# Patient Record
Sex: FEMALE | Race: BLACK OR AFRICAN AMERICAN | NOT HISPANIC OR LATINO | Employment: FULL TIME | URBAN - METROPOLITAN AREA
[De-identification: names, ages, dates, MRNs, and addresses within clinical notes are randomized per-mention and may not be internally consistent; named-entity substitution may affect disease eponyms.]

---

## 2023-03-14 ENCOUNTER — TELEPHONE (OUTPATIENT)
Dept: PRIMARY CARE | Facility: CLINIC | Age: 63
End: 2023-03-14
Payer: COMMERCIAL

## 2023-03-14 DIAGNOSIS — R30.0 DYSURIA: Primary | ICD-10-CM

## 2023-03-14 NOTE — TELEPHONE ENCOUNTER
Patient left a voicemail that the medication she was taking is not working and was advised to call you and let you know she would like to try the other medication

## 2023-04-01 ENCOUNTER — LAB (OUTPATIENT)
Dept: LAB | Facility: LAB | Age: 63
End: 2023-04-01

## 2023-04-01 DIAGNOSIS — R30.0 DYSURIA: ICD-10-CM

## 2023-04-01 LAB
APPEARANCE, URINE: ABNORMAL
BACTERIA, URINE: ABNORMAL /HPF
BILIRUBIN, URINE: NEGATIVE
BLOOD, URINE: ABNORMAL
COLOR, URINE: ABNORMAL
GLUCOSE, URINE: NEGATIVE MG/DL
HYALINE CASTS, URINE: ABNORMAL /LPF
KETONES, URINE: NEGATIVE MG/DL
LEUKOCYTE ESTERASE, URINE: ABNORMAL
MUCUS, URINE: ABNORMAL /LPF
NITRITE, URINE: NEGATIVE
PH, URINE: 5 (ref 5–8)
PROTEIN, URINE: NEGATIVE MG/DL
RBC, URINE: 5 /HPF (ref 0–5)
SPECIFIC GRAVITY, URINE: 1.03 (ref 1–1.03)
SQUAMOUS EPITHELIAL CELLS, URINE: 22 /HPF
UROBILINOGEN, URINE: <2 MG/DL (ref 0–1.9)
WBC, URINE: 24 /HPF (ref 0–5)

## 2023-04-01 PROCEDURE — 87186 SC STD MICRODIL/AGAR DIL: CPT

## 2023-04-01 PROCEDURE — 81003 URINALYSIS AUTO W/O SCOPE: CPT

## 2023-04-01 PROCEDURE — 81001 URINALYSIS AUTO W/SCOPE: CPT

## 2023-04-01 PROCEDURE — 87086 URINE CULTURE/COLONY COUNT: CPT

## 2023-04-01 PROCEDURE — 87077 CULTURE AEROBIC IDENTIFY: CPT

## 2023-04-04 LAB — URINE CULTURE: ABNORMAL

## 2023-04-06 ENCOUNTER — TELEPHONE (OUTPATIENT)
Dept: PRIMARY CARE | Facility: CLINIC | Age: 63
End: 2023-04-06
Payer: COMMERCIAL

## 2023-04-10 DIAGNOSIS — N30.00 ACUTE CYSTITIS WITHOUT HEMATURIA: Primary | ICD-10-CM

## 2023-04-10 PROBLEM — I10 BENIGN ESSENTIAL HYPERTENSION: Status: ACTIVE | Noted: 2017-05-17

## 2023-04-10 PROBLEM — E87.6 HYPOKALEMIA: Status: ACTIVE | Noted: 2018-11-28

## 2023-04-10 PROBLEM — N39.0 UTI (URINARY TRACT INFECTION): Status: ACTIVE | Noted: 2018-11-28

## 2023-04-10 PROBLEM — R73.03 PREDIABETES: Status: ACTIVE | Noted: 2018-05-09

## 2023-04-10 PROBLEM — K29.70 GASTRITIS: Status: ACTIVE | Noted: 2018-11-29

## 2023-04-10 PROBLEM — E78.2 MIXED HYPERLIPIDEMIA: Status: ACTIVE | Noted: 2023-04-10

## 2023-04-10 RX ORDER — HYDROCHLOROTHIAZIDE 12.5 MG/1
12.5 TABLET ORAL DAILY
COMMUNITY
End: 2024-01-18 | Stop reason: SDUPTHER

## 2023-04-10 RX ORDER — POTASSIUM CHLORIDE 1500 MG/1
1 TABLET, EXTENDED RELEASE ORAL DAILY
COMMUNITY
Start: 2022-05-02 | End: 2024-01-18 | Stop reason: SDUPTHER

## 2023-04-10 RX ORDER — ATORVASTATIN CALCIUM 20 MG/1
20 TABLET, FILM COATED ORAL NIGHTLY
COMMUNITY
End: 2024-01-18 | Stop reason: SDUPTHER

## 2023-04-10 RX ORDER — CIPROFLOXACIN 500 MG/1
500 TABLET ORAL 2 TIMES DAILY
Qty: 10 TABLET | Refills: 0 | Status: SHIPPED | OUTPATIENT
Start: 2023-04-10 | End: 2023-04-15

## 2023-06-21 ENCOUNTER — CLINICAL SUPPORT (OUTPATIENT)
Dept: PRIMARY CARE | Facility: CLINIC | Age: 63
End: 2023-06-21
Payer: COMMERCIAL

## 2023-06-21 DIAGNOSIS — R30.0 DYSURIA: Primary | ICD-10-CM

## 2023-06-21 DIAGNOSIS — R05.9 COUGH, UNSPECIFIED: ICD-10-CM

## 2023-06-21 LAB
POC APPEARANCE, URINE: CLEAR
POC BILIRUBIN, URINE: NEGATIVE
POC BLOOD, URINE: ABNORMAL
POC COLOR, URINE: YELLOW
POC GLUCOSE, URINE: NEGATIVE MG/DL
POC KETONES, URINE: NEGATIVE MG/DL
POC LEUKOCYTES, URINE: NEGATIVE
POC NITRITE,URINE: NEGATIVE
POC PH, URINE: 5.5 PH
POC PROTEIN, URINE: NEGATIVE MG/DL
POC SPECIFIC GRAVITY, URINE: >=1.03
POC UROBILINOGEN, URINE: 0.2 EU/DL

## 2023-06-21 PROCEDURE — 81003 URINALYSIS AUTO W/O SCOPE: CPT | Performed by: NURSE PRACTITIONER

## 2023-06-21 PROCEDURE — 87086 URINE CULTURE/COLONY COUNT: CPT

## 2023-06-21 RX ORDER — BENZONATATE 100 MG/1
CAPSULE ORAL
Qty: 45 CAPSULE | Refills: 1 | Status: SHIPPED | OUTPATIENT
Start: 2023-06-21 | End: 2024-01-18 | Stop reason: ALTCHOICE

## 2023-06-21 NOTE — PROGRESS NOTES
Patient came into office with c/o a Urinary Tract Infection, Rapid UA finished, Urine Culture sent out.

## 2023-06-22 LAB — URINE CULTURE: NORMAL

## 2023-08-21 PROBLEM — R30.0 DYSURIA: Status: ACTIVE | Noted: 2023-08-21

## 2023-08-21 RX ORDER — SULFAMETHOXAZOLE AND TRIMETHOPRIM 800; 160 MG/1; MG/1
1 TABLET ORAL 2 TIMES DAILY
COMMUNITY
End: 2023-12-29 | Stop reason: SDUPTHER

## 2023-10-02 ENCOUNTER — OFFICE VISIT (OUTPATIENT)
Dept: PODIATRY | Facility: CLINIC | Age: 63
End: 2023-10-02
Payer: COMMERCIAL

## 2023-10-02 DIAGNOSIS — M79.671 RIGHT FOOT PAIN: Primary | ICD-10-CM

## 2023-10-02 DIAGNOSIS — L85.1 ACQUIRED PLANTAR POROKERATOSIS: ICD-10-CM

## 2023-10-02 PROCEDURE — 99202 OFFICE O/P NEW SF 15 MIN: CPT | Performed by: PODIATRIST

## 2023-10-02 NOTE — PROGRESS NOTES
This is a 63 y.o. female new patient for her initial consult c/o No chief complaint on file.  .      History of Present Illness:   Patient states she has pain to R foot.  States she has a callous to area  States it is tender when WB  Has tried to resolve on her own wo success  No other pedal complaints  Denies DM      Past Medical History  Past Medical History:   Diagnosis Date    Personal history of other specified conditions 04/21/2022    History of urinary frequency    Unspecified symptoms and signs involving the genitourinary system 04/21/2022    UTI symptoms         Medications and Allergies have been reviewed.    Review Of Systems:  GENERAL: No weight loss, malaise or fevers.    Physical Exam:  Vascular  DP and PT pulses are palpable 1/4 bilaterally. CFT is 3 seconds to hallux bilaterally. Skin temperature is warm to cool from proximal to distal bilaterally. No edema or varicosities noted. Hair growth is noted to the digits bilaterally.     Neurology  Vibratory sensation is intact at the level of the hallux IPJ bilaterally. Protective sensation is intact at all 10 sites, using a [place] 5.07 monofilament bilaterally. Proprioception is intact at the 1st MPJ bilaterally.     Dermatology  Nails 1-5 bilaterally are WNL   Webspaces 1-4 bilaterally are clean, dry, and intact without any debris or maceration noted. Hyperkeratotic tissue noted to R sub 5th met head. Debrided to smooth skin.     Musculoskeletal  Muscle strength is 5/5 for plantarflexors, dorsiflexors, everters, and inverters bilaterally.     1. Right foot pain        2. Acquired plantar porokeratosis          Plan:  Patient exam and eval  No radiographs to review  Debrided hpk to smooth skin  Noted to have pain improvement upon WB  Patient to fu prn  All questions answered    Holly Mckeon DPM

## 2023-12-11 ENCOUNTER — TELEPHONE (OUTPATIENT)
Dept: PRIMARY CARE | Facility: CLINIC | Age: 63
End: 2023-12-11
Payer: COMMERCIAL

## 2023-12-26 ENCOUNTER — TELEPHONE (OUTPATIENT)
Dept: PRIMARY CARE | Facility: CLINIC | Age: 63
End: 2023-12-26
Payer: COMMERCIAL

## 2023-12-26 DIAGNOSIS — R30.0 DYSURIA: Primary | ICD-10-CM

## 2023-12-27 ENCOUNTER — CLINICAL SUPPORT (OUTPATIENT)
Dept: PRIMARY CARE | Facility: CLINIC | Age: 63
End: 2023-12-27
Payer: COMMERCIAL

## 2023-12-27 DIAGNOSIS — N39.0 URINARY TRACT INFECTION WITHOUT HEMATURIA, SITE UNSPECIFIED: ICD-10-CM

## 2023-12-27 LAB
POC APPEARANCE, URINE: CLEAR
POC BILIRUBIN, URINE: NEGATIVE
POC BLOOD, URINE: ABNORMAL
POC COLOR, URINE: YELLOW
POC GLUCOSE, URINE: NEGATIVE MG/DL
POC KETONES, URINE: NEGATIVE MG/DL
POC LEUKOCYTES, URINE: ABNORMAL
POC NITRITE,URINE: NEGATIVE
POC PH, URINE: 6 PH
POC PROTEIN, URINE: NEGATIVE MG/DL
POC SPECIFIC GRAVITY, URINE: >=1.03
POC UROBILINOGEN, URINE: 0.2 EU/DL

## 2023-12-27 PROCEDURE — 87086 URINE CULTURE/COLONY COUNT: CPT

## 2023-12-27 PROCEDURE — 81003 URINALYSIS AUTO W/O SCOPE: CPT | Performed by: NURSE PRACTITIONER

## 2023-12-27 PROCEDURE — 87186 SC STD MICRODIL/AGAR DIL: CPT

## 2023-12-27 NOTE — PROGRESS NOTES
Patient came into office with c/o UTI symptoms, POCT UA and Urine Culture sent and done. Will call patient after Provider looks over results.   The patient is a 41y Female complaining of

## 2023-12-29 DIAGNOSIS — N39.0 URINARY TRACT INFECTION WITHOUT HEMATURIA, SITE UNSPECIFIED: Primary | ICD-10-CM

## 2023-12-29 LAB
BACTERIA UR CULT: ABNORMAL
BACTERIA UR CULT: ABNORMAL

## 2023-12-29 RX ORDER — SULFAMETHOXAZOLE AND TRIMETHOPRIM 800; 160 MG/1; MG/1
1 TABLET ORAL 2 TIMES DAILY
Qty: 14 TABLET | Refills: 0 | Status: SHIPPED | OUTPATIENT
Start: 2023-12-29 | End: 2024-01-05

## 2024-01-18 ENCOUNTER — OFFICE VISIT (OUTPATIENT)
Dept: PRIMARY CARE | Facility: CLINIC | Age: 64
End: 2024-01-18
Payer: COMMERCIAL

## 2024-01-18 VITALS
OXYGEN SATURATION: 98 % | HEIGHT: 68 IN | HEART RATE: 64 BPM | DIASTOLIC BLOOD PRESSURE: 90 MMHG | WEIGHT: 226.8 LBS | SYSTOLIC BLOOD PRESSURE: 136 MMHG | BODY MASS INDEX: 34.37 KG/M2

## 2024-01-18 DIAGNOSIS — I10 BENIGN ESSENTIAL HYPERTENSION: Primary | ICD-10-CM

## 2024-01-18 DIAGNOSIS — N39.46 MIXED STRESS AND URGE URINARY INCONTINENCE: ICD-10-CM

## 2024-01-18 DIAGNOSIS — E78.2 MIXED HYPERLIPIDEMIA: ICD-10-CM

## 2024-01-18 DIAGNOSIS — Z12.11 ENCOUNTER FOR SCREENING FOR MALIGNANT NEOPLASM OF COLON: ICD-10-CM

## 2024-01-18 DIAGNOSIS — Z12.39 BREAST SCREENING: ICD-10-CM

## 2024-01-18 DIAGNOSIS — R73.03 PREDIABETES: ICD-10-CM

## 2024-01-18 DIAGNOSIS — N39.0 URINARY TRACT INFECTION WITHOUT HEMATURIA, SITE UNSPECIFIED: ICD-10-CM

## 2024-01-18 PROCEDURE — 81003 URINALYSIS AUTO W/O SCOPE: CPT | Performed by: NURSE PRACTITIONER

## 2024-01-18 PROCEDURE — 3008F BODY MASS INDEX DOCD: CPT | Performed by: NURSE PRACTITIONER

## 2024-01-18 PROCEDURE — 1036F TOBACCO NON-USER: CPT | Performed by: NURSE PRACTITIONER

## 2024-01-18 PROCEDURE — 99214 OFFICE O/P EST MOD 30 MIN: CPT | Performed by: NURSE PRACTITIONER

## 2024-01-18 PROCEDURE — 3075F SYST BP GE 130 - 139MM HG: CPT | Performed by: NURSE PRACTITIONER

## 2024-01-18 PROCEDURE — 3080F DIAST BP >= 90 MM HG: CPT | Performed by: NURSE PRACTITIONER

## 2024-01-18 PROCEDURE — 87086 URINE CULTURE/COLONY COUNT: CPT

## 2024-01-18 RX ORDER — ATORVASTATIN CALCIUM 20 MG/1
20 TABLET, FILM COATED ORAL NIGHTLY
Qty: 90 TABLET | Refills: 1 | Status: SHIPPED | OUTPATIENT
Start: 2024-01-18 | End: 2024-05-16

## 2024-01-18 RX ORDER — POTASSIUM CHLORIDE 20 MEQ/1
20 TABLET, EXTENDED RELEASE ORAL DAILY
Qty: 90 TABLET | Refills: 1 | Status: SHIPPED | OUTPATIENT
Start: 2024-01-18 | End: 2024-07-16

## 2024-01-18 RX ORDER — HYDROCHLOROTHIAZIDE 12.5 MG/1
12.5 TABLET ORAL DAILY
Qty: 90 TABLET | Refills: 1 | Status: SHIPPED | OUTPATIENT
Start: 2024-01-18 | End: 2024-05-24

## 2024-01-18 RX ORDER — MIRABEGRON 25 MG/1
25 TABLET, FILM COATED, EXTENDED RELEASE ORAL DAILY
Qty: 30 TABLET | Refills: 5 | Status: SHIPPED | OUTPATIENT
Start: 2024-01-18 | End: 2024-07-16

## 2024-01-18 NOTE — PATIENT INSTRUCTIONS
BP well controlled on hydrochlorothiazide   Follow a low salt diet  Work on weight loss and healthy diet    Check fasting cholesterol panel  Continue atorvastatin  Follow a low fat, low cholesterol diet.    Check UA and culture for UTI  Plan to start myrbetriq for urinary frequency and incontinence symptoms.  Discussed importance of reducing caffeine in diet.    Schedule screening colonoscopy and mammogram    Follow up in 3-4 months to evaluate therapy

## 2024-01-18 NOTE — PROGRESS NOTES
"Subjective   Patient ID: Arcelia Segovia is a 63 y.o. female who presents for Follow-up ( Patient is in for a follow-up From a Recent urinary tract infection. Patient says she is doing well But is Requesting a Urine test.).    Presents for BP check and follow up.  Asymptomatic.  Tolerating medications well.    Has been having urinary urgency and incontinence.  Has been using feminine clear, helped a little  No current frequency, dysuria.  Never treated for OAB.    Right thumb peeling skin. No other symptoms.        Review of Systems    Objective   /90 (BP Location: Left arm, Patient Position: Sitting, BP Cuff Size: Large adult)   Pulse 64   Ht 1.727 m (5' 8\")   Wt 103 kg (226 lb 12.8 oz)   SpO2 98%   BMI 34.48 kg/m²     Physical Exam  Vitals and nursing note reviewed.   Constitutional:       General: She is not in acute distress.     Appearance: Normal appearance. She is obese.   Cardiovascular:      Rate and Rhythm: Normal rate and regular rhythm.      Heart sounds: Normal heart sounds. No murmur heard.  Pulmonary:      Effort: Pulmonary effort is normal. No respiratory distress.      Breath sounds: Normal breath sounds.   Abdominal:      General: Bowel sounds are normal.      Palpations: Abdomen is soft.      Tenderness: There is abdominal tenderness (suprapubic). There is no right CVA tenderness or left CVA tenderness.   Skin:     Findings: Rash (thumb: flaking, dry skin) present.   Psychiatric:         Mood and Affect: Mood normal.         Judgment: Judgment normal.         Assessment/Plan   Diagnoses and all orders for this visit:  Benign essential hypertension  -     hydroCHLOROthiazide (HYDRODiuril) 12.5 mg tablet; Take 1 tablet (12.5 mg) by mouth once daily.  -     potassium chloride CR 20 mEq ER tablet; Take 1 tablet (20 mEq) by mouth once daily.  Mixed hyperlipidemia  -     atorvastatin (Lipitor) 20 mg tablet; Take 1 tablet (20 mg) by mouth once daily at bedtime.  Prediabetes  -     Hemoglobin " A1C; Future  -     Lipid panel; Future  -     Comprehensive metabolic panel; Future  Urinary tract infection without hematuria, site unspecified  -     POCT UA Automated manually resulted  -     Urine Culture  Breast screening  -     BI mammo bilateral screening tomosynthesis; Future  Encounter for screening for malignant neoplasm of colon  -     Colonoscopy Screening; Average Risk Patient; Future  Mixed stress and urge urinary incontinence  -     mirabegron (Myrbetriq) 25 mg tablet extended release 24 hr 24 hr tablet; Take 1 tablet (25 mg) by mouth once daily.  Adult BMI 34.0-34.9 kg/sq m

## 2024-01-19 LAB — BACTERIA UR CULT: ABNORMAL

## 2024-01-21 DIAGNOSIS — R30.0 DYSURIA: Primary | ICD-10-CM

## 2024-02-01 DIAGNOSIS — Z12.11 ENCOUNTER FOR SCREENING COLONOSCOPY: Primary | ICD-10-CM

## 2024-02-01 RX ORDER — SODIUM PICOSULFATE, MAGNESIUM OXIDE, AND ANHYDROUS CITRIC ACID 10; 3.5; 12 MG/160ML; G/160ML; G/160ML
LIQUID ORAL
Qty: 160 ML | Refills: 0 | Status: SHIPPED | OUTPATIENT
Start: 2024-02-01 | End: 2024-02-05

## 2024-02-05 DIAGNOSIS — Z12.11 COLON CANCER SCREENING: Primary | ICD-10-CM

## 2024-02-06 RX ORDER — POLYETHYLENE GLYCOL-3350 AND ELECTROLYTES 236; 6.74; 5.86; 2.97; 22.74 G/274.31G; G/274.31G; G/274.31G; G/274.31G; G/274.31G
4000 POWDER, FOR SOLUTION ORAL ONCE
Qty: 4000 ML | Refills: 0 | Status: SHIPPED | OUTPATIENT
Start: 2024-02-06 | End: 2024-02-06

## 2024-02-26 DIAGNOSIS — Z12.11 COLON CANCER SCREENING: Primary | ICD-10-CM

## 2024-02-26 RX ORDER — SODIUM PICOSULFATE, MAGNESIUM OXIDE, AND ANHYDROUS CITRIC ACID 12; 3.5; 1 G/175ML; G/175ML; MG/175ML
LIQUID ORAL
Qty: 175 ML | Refills: 0 | Status: SHIPPED | OUTPATIENT
Start: 2024-02-26

## 2024-02-28 PROCEDURE — 87186 SC STD MICRODIL/AGAR DIL: CPT

## 2024-02-28 PROCEDURE — 87086 URINE CULTURE/COLONY COUNT: CPT

## 2024-02-29 ENCOUNTER — LAB REQUISITION (OUTPATIENT)
Dept: LAB | Facility: HOSPITAL | Age: 64
End: 2024-02-29
Payer: COMMERCIAL

## 2024-02-29 DIAGNOSIS — N39.0 URINARY TRACT INFECTION, SITE NOT SPECIFIED: ICD-10-CM

## 2024-03-02 LAB — BACTERIA UR CULT: ABNORMAL

## 2024-03-28 ENCOUNTER — LAB REQUISITION (OUTPATIENT)
Dept: LAB | Facility: HOSPITAL | Age: 64
End: 2024-03-28
Payer: COMMERCIAL

## 2024-03-28 DIAGNOSIS — N30.01 ACUTE CYSTITIS WITH HEMATURIA: ICD-10-CM

## 2024-03-28 DIAGNOSIS — R30.0 DYSURIA: ICD-10-CM

## 2024-03-28 PROCEDURE — 87086 URINE CULTURE/COLONY COUNT: CPT

## 2024-03-28 PROCEDURE — 87186 SC STD MICRODIL/AGAR DIL: CPT

## 2024-03-31 LAB
BACTERIA UR CULT: ABNORMAL
BACTERIA UR CULT: ABNORMAL

## 2024-04-23 ENCOUNTER — OFFICE VISIT (OUTPATIENT)
Dept: OPHTHALMOLOGY | Facility: CLINIC | Age: 64
End: 2024-04-23
Payer: COMMERCIAL

## 2024-04-23 DIAGNOSIS — H25.813 COMBINED FORMS OF AGE-RELATED CATARACT OF BOTH EYES: Primary | ICD-10-CM

## 2024-04-23 DIAGNOSIS — H52.03 HYPEROPIA OF BOTH EYES: ICD-10-CM

## 2024-04-23 PROCEDURE — 92015 DETERMINE REFRACTIVE STATE: CPT | Performed by: OPTOMETRIST

## 2024-04-23 PROCEDURE — 92004 COMPRE OPH EXAM NEW PT 1/>: CPT | Performed by: OPTOMETRIST

## 2024-04-23 ASSESSMENT — CONF VISUAL FIELD
OS_NORMAL: 1
OD_NORMAL: 1
OS_SUPERIOR_NASAL_RESTRICTION: 0
OS_SUPERIOR_TEMPORAL_RESTRICTION: 0
OD_SUPERIOR_TEMPORAL_RESTRICTION: 0
OD_SUPERIOR_NASAL_RESTRICTION: 0
OD_INFERIOR_TEMPORAL_RESTRICTION: 0
OS_INFERIOR_NASAL_RESTRICTION: 0
OS_INFERIOR_TEMPORAL_RESTRICTION: 0
OD_INFERIOR_NASAL_RESTRICTION: 0

## 2024-04-23 ASSESSMENT — ENCOUNTER SYMPTOMS
PSYCHIATRIC NEGATIVE: 0
CONSTITUTIONAL NEGATIVE: 0
CARDIOVASCULAR NEGATIVE: 0
ALLERGIC/IMMUNOLOGIC NEGATIVE: 0
GASTROINTESTINAL NEGATIVE: 0
NEUROLOGICAL NEGATIVE: 0
MUSCULOSKELETAL NEGATIVE: 0
RESPIRATORY NEGATIVE: 0
HEMATOLOGIC/LYMPHATIC NEGATIVE: 0
EYES NEGATIVE: 0
ENDOCRINE NEGATIVE: 0

## 2024-04-23 ASSESSMENT — TONOMETRY
OS_IOP_MMHG: 13
OD_IOP_MMHG: 14
IOP_METHOD: TONOPEN

## 2024-04-23 ASSESSMENT — SLIT LAMP EXAM - LIDS
COMMENTS: NORMAL
COMMENTS: NORMAL

## 2024-04-23 ASSESSMENT — VISUAL ACUITY
OS_CC: 20/20
OD_CC: 20/20
CORRECTION_TYPE: GLASSES
METHOD: SNELLEN - LINEAR

## 2024-04-23 ASSESSMENT — CUP TO DISC RATIO
OD_RATIO: 0.35
OS_RATIO: 0.35

## 2024-04-23 ASSESSMENT — EXTERNAL EXAM - RIGHT EYE: OD_EXAM: NORMAL

## 2024-04-23 ASSESSMENT — REFRACTION_MANIFEST
OD_AXIS: 085
OS_AXIS: 080
OD_ADD: +2.50
OD_CYLINDER: -1.25
OD_SPHERE: +1.00
OS_ADD: +2.50
OS_CYLINDER: -1.75
OS_SPHERE: +1.50

## 2024-04-23 ASSESSMENT — REFRACTION_WEARINGRX
OS_SPHERE: +0.75
OD_ADD: +2.50
OD_CYLINDER: -1.00
OS_ADD: +2.50
OD_AXIS: 087
OS_CYLINDER: -1.00
OD_SPHERE: +0.50
OS_AXIS: 087

## 2024-04-23 ASSESSMENT — EXTERNAL EXAM - LEFT EYE: OS_EXAM: NORMAL

## 2024-04-23 NOTE — PROGRESS NOTES
Assessment/Plan   Diagnoses and all orders for this visit:  Combined forms of age-related cataract of both eyes  Monitor not visually significant    Hyperopia of both eyes  A spectacle prescription was dispensed to be used as needed.    Fu annually

## 2024-04-25 ENCOUNTER — PHARMACY VISIT (OUTPATIENT)
Dept: PHARMACY | Facility: CLINIC | Age: 64
End: 2024-04-25
Payer: COMMERCIAL

## 2024-04-25 PROCEDURE — RXMED WILLOW AMBULATORY MEDICATION CHARGE

## 2024-04-25 RX ORDER — ACETAMINOPHEN AND CODEINE PHOSPHATE 300; 30 MG/1; MG/1
TABLET ORAL
Qty: 15 TABLET | Refills: 0 | OUTPATIENT
Start: 2024-04-25

## 2024-05-02 NOTE — PROGRESS NOTES
[facility]  Outpatient Clinic Note    Subjective   Arcelia Segovia is a 63 y.o. female    History of Present Illness   Patient presenting to clinic today NPV with complaint of urinary frequency   History of recurrent UTI and urinary frequency. Patient denies gross hematuria, fevers, n/v, or flank pain.   She drinks at least 48 oz water daily.   Urinalysis today + Nitrites, Small Blood, Large Leukocytes     Lab Results   Component Value Date    URINECULTURE 20,000 - 80,000 Klebsiella pneumoniae/variicola (A) 03/28/2024    URINECULTURE 20,000 - 80,000 Escherichia coli (A) 03/28/2024    URINECULTURE >100,000 Klebsiella pneumoniae/variicola (A) 02/28/2024       Past Medical History and Surgical History   Past Medical History:   Diagnosis Date    Personal history of other specified conditions 04/21/2022    History of urinary frequency    Unspecified symptoms and signs involving the genitourinary system 04/21/2022    UTI symptoms     Past Surgical History:   Procedure Laterality Date    OTHER SURGICAL HISTORY  12/09/2020    Appendectomy       Medications  Current Outpatient Medications on File Prior to Visit   Medication Sig Dispense Refill    acetaminophen-codeine (Tylenol w/ Codeine #3) 300-30 mg tablet Take 1 tablet by mouth 3 times a day. 15 tablet 0    atorvastatin (Lipitor) 20 mg tablet Take 1 tablet (20 mg) by mouth once daily at bedtime. 90 tablet 1    hydroCHLOROthiazide (HYDRODiuril) 12.5 mg tablet Take 1 tablet (12.5 mg) by mouth once daily. 90 tablet 1    mirabegron (Myrbetriq) 25 mg tablet extended release 24 hr 24 hr tablet Take 1 tablet (25 mg) by mouth once daily. 30 tablet 5    potassium chloride CR 20 mEq ER tablet Take 1 tablet (20 mEq) by mouth once daily. 90 tablet 1    sod picosulf-mag ox-citric ac (Clenpiq) 10 mg-3.5 gram- 12 gram/175 mL solution Take one bottle twice as directed by the prep instructions 175 mL 0     No current facility-administered medications on file prior to visit.        Objective   Physicial Exam  General: Well developed, well nourished, alert and cooperative, appears in no acute distress  Eyes: Non-injected conjunctiva, sclera clear, no proptosis  Cardiac: Extremities are warm and well perfused. No edema, cyanosis or pallor.   Lungs: Breathing is easy, non-labored. Speaking in clear and complete sentences. Normal diaphragmatic movement.  MSK: Ambulatory with steady gait, unassisted  Neuro: alert and oriented to person, place and time  Psych: Demonstrates good judgement and reason, without hallucinations, abnormal affect or abnormal behaviors.  Skin: no obvious lesions, no rashes.    Review of Systems  All other systems have been reviewed and are negative for complaint.      Assessment and Plan   -Recurrent UTI   Start Nitrofurantoin BID x 7 days empirically     Today we discussed UTI in great detail. We discussed that a definitive diagnosis of UTI requires urine culture. Recurrent UTI occurs when treatment with antibiotics causes a positive urine culture to turn negative, but then culture becomes positive again. Recurrent UTI arises either from persistent bacteria inside of the  tract, ex/ urolithiasis, foreign bodies, diverticuli or bacterial prostatitis, or from bacteria outside the  tract, ex/ fistula, urinary retention, vesicoureteral reflux, immunosuppression, sexual intercourse).     We will send urine today for analysis and culture. Will treat as clinically indicated. A standing urine culture has been placed in the  system. If you develop symptoms of UTI, please go to any  lab and drop a urine specimen. You will be contacted with results 2-3 days after drop off.    To evaluate recurrent UTI, will consider CT Urogram / renal ultrasound.     We discussed UTI prevention in great detail. It is recommended to increase daily water intake to at least 80 ounces per day. It is important to have smooth, daily bowel movements and good bowel health. If you are not, you  may take Miralax 17g daily to help with bowel movements. Do not take this if you are having watery or loose stools. You may consider taking a cranberry supplement or D-Mannose daily to prevent bacteria from adhering to your bladder wall. I recommend taking a daily probiotic for good vaginal arabella health. We may need to improve the quality of your vaginal tissues, to prevent bacteria from getting into your bladder.     We discussed use vaginal estrogen cream. Start Estrace cream, explained. She denies personal history of breast or gynecological cancer. I explained that small amounts of estrogen can be detected in the blood with use of Estrace cream and that the patient should follow up regularly for breast and gynecological cancer screening.    Patient will follow up in 6 to 8 weeks to re-evaluate symptoms, sooner if indicated.     All questions and concerns were addressed. Patient verbalizes understanding and has no other questions at this time. You are able to have email access to your chart. You can sign into [employer] or add the Follow My Health nir on your smart phone to review today's visit and lab work.   If you have any questions about your care, do not hesitate to call and leave a message, we return calls in a timely manner.    Radha Tilley-- KIRTI TOMAS  Office Phone:  [phone]

## 2024-05-03 ENCOUNTER — PHARMACY VISIT (OUTPATIENT)
Dept: PHARMACY | Facility: CLINIC | Age: 64
End: 2024-05-03
Payer: COMMERCIAL

## 2024-05-03 ENCOUNTER — OFFICE VISIT (OUTPATIENT)
Dept: UROLOGY | Facility: HOSPITAL | Age: 64
End: 2024-05-03
Payer: COMMERCIAL

## 2024-05-03 ENCOUNTER — APPOINTMENT (OUTPATIENT)
Dept: LAB | Facility: LAB | Age: 64
End: 2024-05-03
Payer: COMMERCIAL

## 2024-05-03 DIAGNOSIS — N39.0 RECURRENT UTI: ICD-10-CM

## 2024-05-03 DIAGNOSIS — N30.01 ACUTE CYSTITIS WITH HEMATURIA: Primary | ICD-10-CM

## 2024-05-03 DIAGNOSIS — R35.0 FREQUENCY OF URINATION: ICD-10-CM

## 2024-05-03 LAB
APPEARANCE UR: ABNORMAL
BACTERIA #/AREA URNS AUTO: ABNORMAL /HPF
BILIRUB UR STRIP.AUTO-MCNC: NEGATIVE MG/DL
COLOR UR: YELLOW
GLUCOSE UR STRIP.AUTO-MCNC: NORMAL MG/DL
KETONES UR STRIP.AUTO-MCNC: NEGATIVE MG/DL
LEUKOCYTE ESTERASE UR QL STRIP.AUTO: ABNORMAL
MUCOUS THREADS #/AREA URNS AUTO: ABNORMAL /LPF
NITRITE UR QL STRIP.AUTO: ABNORMAL
PH UR STRIP.AUTO: 5 [PH]
POC APPEARANCE, URINE: CLEAR
POC BILIRUBIN, URINE: NEGATIVE
POC BLOOD, URINE: ABNORMAL
POC COLOR, URINE: YELLOW
POC GLUCOSE, URINE: NEGATIVE MG/DL
POC KETONES, URINE: NEGATIVE MG/DL
POC LEUKOCYTES, URINE: ABNORMAL
POC NITRITE,URINE: POSITIVE
POC PH, URINE: 5.5 PH
POC PROTEIN, URINE: NEGATIVE MG/DL
POC SPECIFIC GRAVITY, URINE: 1.02
POC UROBILINOGEN, URINE: 0.2 EU/DL
PROT UR STRIP.AUTO-MCNC: ABNORMAL MG/DL
RBC # UR STRIP.AUTO: ABNORMAL /UL
RBC #/AREA URNS AUTO: >20 /HPF
SP GR UR STRIP.AUTO: 1.02
SQUAMOUS #/AREA URNS AUTO: ABNORMAL /HPF
UROBILINOGEN UR STRIP.AUTO-MCNC: NORMAL MG/DL
WBC #/AREA URNS AUTO: >50 /HPF
WBC CLUMPS #/AREA URNS AUTO: ABNORMAL /HPF

## 2024-05-03 PROCEDURE — 81001 URINALYSIS AUTO W/SCOPE: CPT | Performed by: NURSE PRACTITIONER

## 2024-05-03 PROCEDURE — 99213 OFFICE O/P EST LOW 20 MIN: CPT | Performed by: NURSE PRACTITIONER

## 2024-05-03 PROCEDURE — 99203 OFFICE O/P NEW LOW 30 MIN: CPT | Performed by: NURSE PRACTITIONER

## 2024-05-03 PROCEDURE — 3008F BODY MASS INDEX DOCD: CPT | Performed by: NURSE PRACTITIONER

## 2024-05-03 PROCEDURE — 87086 URINE CULTURE/COLONY COUNT: CPT | Performed by: NURSE PRACTITIONER

## 2024-05-03 PROCEDURE — 81003 URINALYSIS AUTO W/O SCOPE: CPT | Mod: 59 | Performed by: NURSE PRACTITIONER

## 2024-05-03 PROCEDURE — RXMED WILLOW AMBULATORY MEDICATION CHARGE

## 2024-05-03 RX ORDER — ESTRADIOL 0.1 MG/G
CREAM VAGINAL
Qty: 127.5 G | Refills: 3 | Status: SHIPPED | OUTPATIENT
Start: 2024-05-03

## 2024-05-03 RX ORDER — NITROFURANTOIN 25; 75 MG/1; MG/1
100 CAPSULE ORAL 2 TIMES DAILY
Qty: 14 CAPSULE | Refills: 0 | Status: SHIPPED | OUTPATIENT
Start: 2024-05-03 | End: 2024-05-10

## 2024-05-05 LAB — BACTERIA UR CULT: ABNORMAL

## 2024-05-11 ENCOUNTER — LAB (OUTPATIENT)
Dept: LAB | Facility: LAB | Age: 64
End: 2024-05-11
Payer: COMMERCIAL

## 2024-05-11 DIAGNOSIS — R73.03 PREDIABETES: ICD-10-CM

## 2024-05-11 LAB
ALBUMIN SERPL BCP-MCNC: 4.5 G/DL (ref 3.4–5)
ALP SERPL-CCNC: 92 U/L (ref 33–136)
ALT SERPL W P-5'-P-CCNC: 17 U/L (ref 7–45)
ANION GAP SERPL CALC-SCNC: 11 MMOL/L (ref 10–20)
AST SERPL W P-5'-P-CCNC: 14 U/L (ref 9–39)
BILIRUB SERPL-MCNC: 0.6 MG/DL (ref 0–1.2)
BUN SERPL-MCNC: 13 MG/DL (ref 6–23)
CALCIUM SERPL-MCNC: 9.2 MG/DL (ref 8.6–10.3)
CHLORIDE SERPL-SCNC: 104 MMOL/L (ref 98–107)
CHOLEST SERPL-MCNC: 144 MG/DL (ref 0–199)
CHOLESTEROL/HDL RATIO: 3.4
CO2 SERPL-SCNC: 30 MMOL/L (ref 21–32)
CREAT SERPL-MCNC: 0.66 MG/DL (ref 0.5–1.05)
EGFRCR SERPLBLD CKD-EPI 2021: >90 ML/MIN/1.73M*2
EST. AVERAGE GLUCOSE BLD GHB EST-MCNC: 123 MG/DL
GLUCOSE SERPL-MCNC: 87 MG/DL (ref 74–99)
HBA1C MFR BLD: 5.9 %
HDLC SERPL-MCNC: 41.9 MG/DL
LDLC SERPL CALC-MCNC: 84 MG/DL
NON HDL CHOLESTEROL: 102 MG/DL (ref 0–149)
POTASSIUM SERPL-SCNC: 4.3 MMOL/L (ref 3.5–5.3)
PROT SERPL-MCNC: 6.5 G/DL (ref 6.4–8.2)
SODIUM SERPL-SCNC: 141 MMOL/L (ref 136–145)
TRIGL SERPL-MCNC: 93 MG/DL (ref 0–149)
VLDL: 19 MG/DL (ref 0–40)

## 2024-05-11 PROCEDURE — 83036 HEMOGLOBIN GLYCOSYLATED A1C: CPT

## 2024-05-11 PROCEDURE — 80053 COMPREHEN METABOLIC PANEL: CPT

## 2024-05-11 PROCEDURE — 36415 COLL VENOUS BLD VENIPUNCTURE: CPT

## 2024-05-11 PROCEDURE — 80061 LIPID PANEL: CPT

## 2024-05-16 DIAGNOSIS — E78.2 MIXED HYPERLIPIDEMIA: ICD-10-CM

## 2024-05-16 RX ORDER — ATORVASTATIN CALCIUM 20 MG/1
20 TABLET, FILM COATED ORAL NIGHTLY
Qty: 90 TABLET | Refills: 1 | Status: SHIPPED | OUTPATIENT
Start: 2024-05-16 | End: 2024-11-12

## 2024-05-23 DIAGNOSIS — I10 BENIGN ESSENTIAL HYPERTENSION: ICD-10-CM

## 2024-05-24 RX ORDER — HYDROCHLOROTHIAZIDE 12.5 MG/1
12.5 TABLET ORAL DAILY
Qty: 90 TABLET | Refills: 0 | Status: SHIPPED | OUTPATIENT
Start: 2024-05-24

## 2024-06-06 ENCOUNTER — ANESTHESIA EVENT (OUTPATIENT)
Dept: GASTROENTEROLOGY | Facility: HOSPITAL | Age: 64
End: 2024-06-06

## 2024-06-06 NOTE — ANESTHESIA PREPROCEDURE EVALUATION
Patient: Arcelia Segovia    Procedure Information       Date/Time: 06/07/24 0900    Scheduled providers: Marcela Lopes MD    Procedure: COLONOSCOPY    Location: [facility]            Relevant Problems   Cardiac   (+) Benign essential hypertension   (+) Mixed hyperlipidemia      /Renal   (+) UTI (urinary tract infection)      ID   (+) UTI (urinary tract infection)       Clinical information reviewed:                    Past Medical History:   Diagnosis Date    Hypertension     Personal history of other specified conditions 04/21/2022    History of urinary frequency    Unspecified symptoms and signs involving the genitourinary system 04/21/2022    UTI symptoms      Past Surgical History:   Procedure Laterality Date    APPENDECTOMY      CHOLECYSTECTOMY  06/21/2016    ESOPHAGOGASTRODUODENOSCOPY      TUBAL LIGATION       Social History     Tobacco Use    Smoking status: Former     Current packs/day: 0.50     Types: Cigarettes    Smokeless tobacco: Never   Vaping Use    Vaping status: Never Used   Substance Use Topics    Alcohol use: Not Currently    Drug use: Never      Current Outpatient Medications   Medication Instructions    acetaminophen-codeine (Tylenol w/ Codeine #3) 300-30 mg tablet Take 1 tablet by mouth 3 times a day.    atorvastatin (LIPITOR) 20 mg, oral, Nightly    estradiol (Estrace) 0.01 % (0.1 mg/gram) vaginal cream Apply a pea size amount before bedtime 3 times per week.    hydroCHLOROthiazide (MICROZIDE) 12.5 mg, oral, Daily    mirabegron (MYRBETRIQ) 25 mg, oral, Daily    potassium chloride CR 20 mEq ER tablet 20 mEq, oral, Daily    sod picosulf-mag ox-citric ac (Clenpiq) 10 mg-3.5 gram- 12 gram/175 mL solution Take one bottle twice as directed by the prep instructions      No Known Allergies     Chemistry    Lab Results   Component Value Date/Time     05/11/2024 0900    K 4.3 05/11/2024 0900     05/11/2024 0900    CO2 30 05/11/2024 0900    BUN 13 05/11/2024 0900     "CREATININE 0.66 05/11/2024 0900    Lab Results   Component Value Date/Time    CALCIUM 9.2 05/11/2024 0900    ALKPHOS 92 05/11/2024 0900    AST 14 05/11/2024 0900    ALT 17 05/11/2024 0900    BILITOT 0.6 05/11/2024 0900          Lab Results   Component Value Date    HGBA1C 5.9 (H) 05/11/2024     Lab Results   Component Value Date/Time    WBC 6.0 05/02/2022 1248    HGB 14.1 05/02/2022 1248    HCT 42.9 05/02/2022 1248     05/02/2022 1248     No results found for: \"PROTIME\", \"PTT\", \"INR\"  No results found for: \"ABORH\"  No results found for this or any previous visit (from the past 4464 hour(s)).  No results found for this or any previous visit from the past 1095 days.     Echo 6/22/2019@[facility]:  LEFT VENTRICLE   The left ventricle is normal in size.   There is moderate left ventricular hypertrophy.   Left ventricular systolic function is normal.   Normal left ventricular diastolic function.   Mitral annular lateral E/e': 5.0. Mitral annular septal E/e': 8.0.   Wall Motion:   All scored segments are normal.     RIGHT VENTRICLE   The right ventricle is normal in size.   Right ventricular systolic function is normal. RV systolic tissue Doppler velocity    is 14.6 cm/s. Tricuspid annular displacement is 2.1 cm.   Estimated right ventricular systolic pressure is likely underestimated due to a   weak or incomplete tricuspid regurgitation signal and is, at least, 31 mmHg   consistent with normal pulmonary artery pressures. Estimated right atrial pressure    is 3 mmHg based on IVC assessment.     LEFT ATRIUM   The left atrial cavity is normal in size.   Pulmonary Veins:   The pulmonary venous pattern showed normal systolic flow.     RIGHT ATRIUM   The right atrial cavity is normal in size.   Inferior Vena Cava:   The inferior vena cava appears normal measuring 1.0 cm. The vessel decreases   greater than 50 percent with inspiration.     MITRAL VALVE   The mitral valve leaflets are structurally normal. Native mitral valve. " There is   trace mitral valve regurgitation. There is a centrally directed regurgitant jet   originating along the central aspect of the coaptation line. The pressure half   time is 71 msec. The peak mitral E/A ratio is 0.70. The average mitral E/e' ratio   is 6.5. The mitral flow deceleration time is 246 msec.     TRICUSPID VALVE   The tricuspid valve leaflets are structurally normal. Native tricuspid valve.   There is trace (trace - 1+) tricuspid valve regurgitation. There is a centrally   directed regurgitant jet originating along the central aspect of the coaptation   line. The hepatic venous pattern showed normal systolic flow.     AORTIC VALVE   The aortic valve cusps are structurally normal. There is no aortic valve   regurgitation. Tricuspid aortic valve.     PULMONIC VALVE   The pulmonic valve cusps are structurally normal. There is trace (trace - 1+)   pulmonic valve regurgitation. There is a centrally directed regurgitant jet   originating centrally.     AORTA   The visualized aorta is normal in size.   Measurements - Aortic valve annulus 1.8 cm. Sinus 2.7 cm. Sinotubular junction 2.3    cm. Mid ascending aorta 2.9 cm.     PERICARDIUM   The pericardium is normal. There is an epicardial fat pad.     CONCLUSIONS:   - Exam indication: Abnormal ECG   - The left ventricle is normal in size. There is moderate left ventricular   hypertrophy. Left ventricular systolic function is normal. EF = 61 ± 5% (2D   biplane) Normal left ventricular diastolic function.   - The right ventricle is normal in size. Right ventricular systolic function is   normal.   - There are no significant valvular abnormalities.   - The patient has not had a prior [facility] echocardiographic exam for comparison.     Visit Vitals  Smoking Status Former     No data recorded     Physical Exam     Anesthesia Plan

## 2024-06-07 ENCOUNTER — APPOINTMENT (OUTPATIENT)
Dept: GASTROENTEROLOGY | Facility: HOSPITAL | Age: 64
End: 2024-06-07
Payer: COMMERCIAL

## 2024-06-07 ENCOUNTER — ANESTHESIA (OUTPATIENT)
Dept: GASTROENTEROLOGY | Facility: HOSPITAL | Age: 64
End: 2024-06-07
Payer: COMMERCIAL

## 2024-06-13 PROCEDURE — RXMED WILLOW AMBULATORY MEDICATION CHARGE

## 2024-06-18 ENCOUNTER — PHARMACY VISIT (OUTPATIENT)
Dept: PHARMACY | Facility: CLINIC | Age: 64
End: 2024-06-18
Payer: COMMERCIAL

## 2024-06-21 ENCOUNTER — PHARMACY VISIT (OUTPATIENT)
Dept: PHARMACY | Facility: CLINIC | Age: 64
End: 2024-06-21

## 2024-07-08 ENCOUNTER — APPOINTMENT (OUTPATIENT)
Dept: OBSTETRICS AND GYNECOLOGY | Facility: CLINIC | Age: 64
End: 2024-07-08
Payer: COMMERCIAL

## 2024-07-08 VITALS
BODY MASS INDEX: 43.79 KG/M2 | SYSTOLIC BLOOD PRESSURE: 141 MMHG | WEIGHT: 238 LBS | HEIGHT: 62 IN | DIASTOLIC BLOOD PRESSURE: 75 MMHG

## 2024-07-08 DIAGNOSIS — R35.0 URINE FREQUENCY: Primary | ICD-10-CM

## 2024-07-08 LAB
POC BLOOD, URINE: ABNORMAL
POC GLUCOSE, URINE: NEGATIVE MG/DL
POC KETONES, URINE: NEGATIVE MG/DL
POC LEUKOCYTES, URINE: ABNORMAL
POC NITRITE,URINE: NEGATIVE
POC PROTEIN, URINE: NEGATIVE MG/DL

## 2024-07-08 PROCEDURE — 3077F SYST BP >= 140 MM HG: CPT | Performed by: NURSE PRACTITIONER

## 2024-07-08 PROCEDURE — 87086 URINE CULTURE/COLONY COUNT: CPT

## 2024-07-08 PROCEDURE — 3008F BODY MASS INDEX DOCD: CPT | Performed by: NURSE PRACTITIONER

## 2024-07-08 PROCEDURE — 87186 SC STD MICRODIL/AGAR DIL: CPT

## 2024-07-08 PROCEDURE — 1036F TOBACCO NON-USER: CPT | Performed by: NURSE PRACTITIONER

## 2024-07-08 PROCEDURE — 81003 URINALYSIS AUTO W/O SCOPE: CPT | Performed by: NURSE PRACTITIONER

## 2024-07-08 PROCEDURE — 99203 OFFICE O/P NEW LOW 30 MIN: CPT | Performed by: NURSE PRACTITIONER

## 2024-07-08 PROCEDURE — 3078F DIAST BP <80 MM HG: CPT | Performed by: NURSE PRACTITIONER

## 2024-07-08 ASSESSMENT — PATIENT HEALTH QUESTIONNAIRE - PHQ9
SUM OF ALL RESPONSES TO PHQ9 QUESTIONS 1 & 2: 0
1. LITTLE INTEREST OR PLEASURE IN DOING THINGS: NOT AT ALL
2. FEELING DOWN, DEPRESSED OR HOPELESS: NOT AT ALL

## 2024-07-08 ASSESSMENT — ENCOUNTER SYMPTOMS
ENDOCRINE NEGATIVE: 0
GASTROINTESTINAL NEGATIVE: 0
FREQUENCY: 1
NEUROLOGICAL NEGATIVE: 0
MUSCULOSKELETAL NEGATIVE: 0
ALLERGIC/IMMUNOLOGIC NEGATIVE: 0
HEMATOLOGIC/LYMPHATIC NEGATIVE: 0
RESPIRATORY NEGATIVE: 0
LOSS OF SENSATION IN FEET: 0
EYES NEGATIVE: 0
PSYCHIATRIC NEGATIVE: 0
DEPRESSION: 0
DYSURIA: 0
CONSTITUTIONAL NEGATIVE: 0
OCCASIONAL FEELINGS OF UNSTEADINESS: 0
CARDIOVASCULAR NEGATIVE: 0

## 2024-07-08 ASSESSMENT — PAIN SCALES - GENERAL: PAINLEVEL: 0-NO PAIN

## 2024-07-08 ASSESSMENT — LIFESTYLE VARIABLES
AUDIT-C TOTAL SCORE: 0
HOW OFTEN DO YOU HAVE A DRINK CONTAINING ALCOHOL: NEVER
HOW OFTEN DO YOU HAVE SIX OR MORE DRINKS ON ONE OCCASION: NEVER
SKIP TO QUESTIONS 9-10: 1
HOW MANY STANDARD DRINKS CONTAINING ALCOHOL DO YOU HAVE ON A TYPICAL DAY: PATIENT DOES NOT DRINK

## 2024-07-08 NOTE — PROGRESS NOTES
Subjective   Patient ID: Arcelia Segovia is a 63 y.o. female who presents for New Patient Visit (New patient visit/possible UTI last pap 12/9/20 wnl HPV negative no mammogram on file).  HPI  Patient is 63-year-old new patient to the practice here today believing she may have a urinary tract infection.    Patient states she has urinary frequency and her urine is malodorous.    Chart review shows patient has had visits with urology and is on an anticholinergic as well as vaginal estrogen.  She denies any pain with urination and is aware her antihypertensive medication does increase urination as well.  In office urinalysis unremarkable except for trace amounts blood  Review of Systems   Genitourinary:  Positive for frequency. Negative for dysuria.   All other systems reviewed and are negative.      Objective   Physical Exam deferred    Assessment/Plan   Problem List Items Addressed This Visit    None  Visit Diagnoses         Codes    Urine frequency    -  Primary R35.0    Relevant Orders    Urine Culture          Patient has an order for mammogram already placed by her primary provider.  Strongly encouraged to get that scheduled.     GENOVEVA Paredes-CNP 07/08/24 1:41 PM

## 2024-07-10 ENCOUNTER — TELEPHONE (OUTPATIENT)
Dept: OBSTETRICS AND GYNECOLOGY | Facility: CLINIC | Age: 64
End: 2024-07-10
Payer: COMMERCIAL

## 2024-07-10 RX ORDER — NITROFURANTOIN 25; 75 MG/1; MG/1
100 CAPSULE ORAL 2 TIMES DAILY
Qty: 14 CAPSULE | Refills: 0 | Status: SHIPPED | OUTPATIENT
Start: 2024-07-10 | End: 2024-07-11

## 2024-07-11 DIAGNOSIS — I10 BENIGN ESSENTIAL HYPERTENSION: ICD-10-CM

## 2024-07-11 DIAGNOSIS — N39.46 MIXED STRESS AND URGE URINARY INCONTINENCE: ICD-10-CM

## 2024-07-11 DIAGNOSIS — E78.2 MIXED HYPERLIPIDEMIA: ICD-10-CM

## 2024-07-11 LAB — BACTERIA UR CULT: ABNORMAL

## 2024-07-11 RX ORDER — SULFAMETHOXAZOLE AND TRIMETHOPRIM 800; 160 MG/1; MG/1
1 TABLET ORAL 2 TIMES DAILY
Qty: 6 TABLET | Refills: 0 | Status: SHIPPED | OUTPATIENT
Start: 2024-07-11 | End: 2024-07-14

## 2024-07-11 RX ORDER — SULFAMETHOXAZOLE AND TRIMETHOPRIM 800; 160 MG/1; MG/1
1 TABLET ORAL 2 TIMES DAILY
Qty: 14 TABLET | Refills: 0 | Status: SHIPPED | OUTPATIENT
Start: 2024-07-11 | End: 2024-07-11 | Stop reason: WASHOUT

## 2024-07-11 RX ORDER — POTASSIUM CHLORIDE 1500 MG/1
20 TABLET, EXTENDED RELEASE ORAL DAILY
Qty: 90 TABLET | Refills: 1 | Status: SHIPPED | OUTPATIENT
Start: 2024-07-11

## 2024-07-11 RX ORDER — ATORVASTATIN CALCIUM 20 MG/1
20 TABLET, FILM COATED ORAL NIGHTLY
Qty: 90 TABLET | Refills: 1 | Status: SHIPPED | OUTPATIENT
Start: 2024-07-11 | End: 2025-01-07

## 2024-07-11 RX ORDER — MIRABEGRON 25 MG/1
25 TABLET, FILM COATED, EXTENDED RELEASE ORAL DAILY
Qty: 30 TABLET | Refills: 5 | Status: SHIPPED | OUTPATIENT
Start: 2024-07-11

## 2024-07-16 DIAGNOSIS — N39.46 MIXED STRESS AND URGE URINARY INCONTINENCE: ICD-10-CM

## 2024-07-17 RX ORDER — MIRABEGRON 25 MG/1
25 TABLET, FILM COATED, EXTENDED RELEASE ORAL DAILY
Qty: 30 TABLET | Refills: 5 | Status: SHIPPED | OUTPATIENT
Start: 2024-07-17 | End: 2025-01-13

## 2024-07-17 RX ORDER — MIRABEGRON 25 MG/1
25 TABLET, FILM COATED, EXTENDED RELEASE ORAL DAILY
Qty: 30 TABLET | Refills: 5 | Status: SHIPPED | OUTPATIENT
Start: 2024-07-17

## 2024-07-26 ENCOUNTER — LAB REQUISITION (OUTPATIENT)
Dept: LAB | Facility: HOSPITAL | Age: 64
End: 2024-07-26
Payer: COMMERCIAL

## 2024-07-26 DIAGNOSIS — N39.0 URINARY TRACT INFECTION, SITE NOT SPECIFIED: ICD-10-CM

## 2024-07-26 DIAGNOSIS — R35.0 FREQUENCY OF MICTURITION: ICD-10-CM

## 2024-07-26 PROCEDURE — 87086 URINE CULTURE/COLONY COUNT: CPT

## 2024-07-28 LAB — BACTERIA UR CULT: NORMAL

## 2024-08-02 ENCOUNTER — APPOINTMENT (OUTPATIENT)
Dept: UROLOGY | Facility: CLINIC | Age: 64
End: 2024-08-02
Payer: COMMERCIAL

## 2024-08-05 DIAGNOSIS — Z12.31 ENCOUNTER FOR SCREENING MAMMOGRAM FOR BREAST CANCER: ICD-10-CM

## 2024-08-27 RX ORDER — SODIUM CHLORIDE, SODIUM LACTATE, POTASSIUM CHLORIDE, CALCIUM CHLORIDE 600; 310; 30; 20 MG/100ML; MG/100ML; MG/100ML; MG/100ML
20 INJECTION, SOLUTION INTRAVENOUS CONTINUOUS
OUTPATIENT
Start: 2024-08-27

## 2024-08-28 ENCOUNTER — APPOINTMENT (OUTPATIENT)
Dept: GASTROENTEROLOGY | Facility: HOSPITAL | Age: 64
End: 2024-08-28
Payer: COMMERCIAL

## 2024-09-10 ENCOUNTER — APPOINTMENT (OUTPATIENT)
Dept: OBSTETRICS AND GYNECOLOGY | Facility: CLINIC | Age: 64
End: 2024-09-10
Payer: COMMERCIAL

## 2024-10-08 ENCOUNTER — APPOINTMENT (OUTPATIENT)
Dept: PRIMARY CARE | Facility: CLINIC | Age: 64
End: 2024-10-08
Payer: COMMERCIAL

## 2024-10-08 ENCOUNTER — DOCUMENTATION (OUTPATIENT)
Dept: PRIMARY CARE | Facility: CLINIC | Age: 64
End: 2024-10-08

## 2024-10-08 VITALS
BODY MASS INDEX: 44.13 KG/M2 | WEIGHT: 239.8 LBS | TEMPERATURE: 97.5 F | SYSTOLIC BLOOD PRESSURE: 132 MMHG | HEIGHT: 62 IN | OXYGEN SATURATION: 97 % | DIASTOLIC BLOOD PRESSURE: 88 MMHG | HEART RATE: 74 BPM

## 2024-10-08 DIAGNOSIS — N39.46 MIXED STRESS AND URGE URINARY INCONTINENCE: ICD-10-CM

## 2024-10-08 DIAGNOSIS — I10 BENIGN ESSENTIAL HYPERTENSION: Primary | ICD-10-CM

## 2024-10-08 DIAGNOSIS — E78.2 MIXED HYPERLIPIDEMIA: ICD-10-CM

## 2024-10-08 DIAGNOSIS — E66.813 CLASS 3 SEVERE OBESITY DUE TO EXCESS CALORIES WITH SERIOUS COMORBIDITY AND BODY MASS INDEX (BMI) OF 40.0 TO 44.9 IN ADULT: ICD-10-CM

## 2024-10-08 DIAGNOSIS — E66.01 CLASS 3 SEVERE OBESITY DUE TO EXCESS CALORIES WITH SERIOUS COMORBIDITY AND BODY MASS INDEX (BMI) OF 40.0 TO 44.9 IN ADULT: ICD-10-CM

## 2024-10-08 DIAGNOSIS — Z23 ENCOUNTER FOR IMMUNIZATION: ICD-10-CM

## 2024-10-08 DIAGNOSIS — R73.03 PREDIABETES: ICD-10-CM

## 2024-10-08 LAB
POC APPEARANCE, URINE: ABNORMAL
POC BILIRUBIN, URINE: NEGATIVE
POC BLOOD, URINE: NEGATIVE
POC COLOR, URINE: YELLOW
POC GLUCOSE, URINE: NEGATIVE MG/DL
POC KETONES, URINE: NEGATIVE MG/DL
POC LEUKOCYTES, URINE: NEGATIVE
POC NITRITE,URINE: NEGATIVE
POC PH, URINE: 6 PH
POC PROTEIN, URINE: NEGATIVE MG/DL
POC SPECIFIC GRAVITY, URINE: 1.02
POC UROBILINOGEN, URINE: 0.2 EU/DL

## 2024-10-08 PROCEDURE — 87086 URINE CULTURE/COLONY COUNT: CPT

## 2024-10-08 PROCEDURE — 90471 IMMUNIZATION ADMIN: CPT | Performed by: NURSE PRACTITIONER

## 2024-10-08 PROCEDURE — 3078F DIAST BP <80 MM HG: CPT | Performed by: NURSE PRACTITIONER

## 2024-10-08 PROCEDURE — 3008F BODY MASS INDEX DOCD: CPT | Performed by: NURSE PRACTITIONER

## 2024-10-08 PROCEDURE — 81003 URINALYSIS AUTO W/O SCOPE: CPT | Performed by: NURSE PRACTITIONER

## 2024-10-08 PROCEDURE — 90656 IIV3 VACC NO PRSV 0.5 ML IM: CPT | Performed by: NURSE PRACTITIONER

## 2024-10-08 PROCEDURE — 3074F SYST BP LT 130 MM HG: CPT | Performed by: NURSE PRACTITIONER

## 2024-10-08 PROCEDURE — 99214 OFFICE O/P EST MOD 30 MIN: CPT | Performed by: NURSE PRACTITIONER

## 2024-10-08 ASSESSMENT — ENCOUNTER SYMPTOMS
LOSS OF SENSATION IN FEET: 0
OCCASIONAL FEELINGS OF UNSTEADINESS: 0
DEPRESSION: 0

## 2024-10-08 ASSESSMENT — PATIENT HEALTH QUESTIONNAIRE - PHQ9
2. FEELING DOWN, DEPRESSED OR HOPELESS: NOT AT ALL
1. LITTLE INTEREST OR PLEASURE IN DOING THINGS: NOT AT ALL
SUM OF ALL RESPONSES TO PHQ9 QUESTIONS 1 AND 2: 0

## 2024-10-08 ASSESSMENT — PAIN SCALES - GENERAL: PAINLEVEL: 0-NO PAIN

## 2024-10-08 NOTE — PATIENT INSTRUCTIONS
Continue current medications as prescribed.  Follow a low salt diet  Check blood pressure at home  Goal BP less than 135/85  Recommend regular aerobic exercise at least 30 minutes a day at least 5 days a week for cardiovascular health  Maintain a healthy weight    Check urine sample to rule out UTI  Will watch urinary frequency at this time off Mirbetriq  Let me know if you want to resume this medication    Follow up in 3-4 months for evaluation of therapy

## 2024-10-09 DIAGNOSIS — I10 BENIGN ESSENTIAL HYPERTENSION: ICD-10-CM

## 2024-10-09 LAB — BACTERIA UR CULT: NORMAL

## 2024-10-09 PROCEDURE — RXMED WILLOW AMBULATORY MEDICATION CHARGE

## 2024-10-09 RX ORDER — HYDROCHLOROTHIAZIDE 12.5 MG/1
TABLET ORAL
Qty: 90 TABLET | Refills: 1 | Status: SHIPPED | OUTPATIENT
Start: 2024-10-09

## 2024-10-09 RX ORDER — POTASSIUM CHLORIDE 20 MEQ/1
20 TABLET, EXTENDED RELEASE ORAL DAILY
Qty: 90 TABLET | Refills: 1 | Status: SHIPPED | OUTPATIENT
Start: 2024-10-09

## 2024-10-10 ENCOUNTER — PHARMACY VISIT (OUTPATIENT)
Dept: PHARMACY | Facility: CLINIC | Age: 64
End: 2024-10-10
Payer: COMMERCIAL

## 2024-11-01 ENCOUNTER — OFFICE VISIT (OUTPATIENT)
Dept: URGENT CARE | Age: 64
End: 2024-11-01
Payer: COMMERCIAL

## 2024-11-01 VITALS
RESPIRATION RATE: 18 BRPM | HEART RATE: 80 BPM | DIASTOLIC BLOOD PRESSURE: 89 MMHG | TEMPERATURE: 97.9 F | SYSTOLIC BLOOD PRESSURE: 167 MMHG | OXYGEN SATURATION: 95 %

## 2024-11-01 DIAGNOSIS — N89.8 VAGINAL ODOR: ICD-10-CM

## 2024-11-01 DIAGNOSIS — R35.0 FREQUENT URINATION: Primary | ICD-10-CM

## 2024-11-01 LAB
POC BACTERIAL VAGINITIS (RAPID): NEGATIVE
POC BILIRUBIN, URINE: NEGATIVE
POC BLOOD, URINE: ABNORMAL
POC GLUCOSE, URINE: NEGATIVE MG/DL
POC KETONES, URINE: NEGATIVE MG/DL
POC LEUKOCYTES, URINE: ABNORMAL
POC NITRITE,URINE: NEGATIVE
POC PH, URINE: 6 PH
POC PROTEIN, URINE: NEGATIVE MG/DL
POC SPECIFIC GRAVITY, URINE: 1.01
POC UROBILINOGEN, URINE: 0.2 EU/DL

## 2024-11-01 PROCEDURE — 87102 FUNGUS ISOLATION CULTURE: CPT

## 2024-11-01 PROCEDURE — 87086 URINE CULTURE/COLONY COUNT: CPT

## 2024-11-01 RX ORDER — SULFAMETHOXAZOLE AND TRIMETHOPRIM 800; 160 MG/1; MG/1
1 TABLET ORAL 2 TIMES DAILY
Qty: 20 TABLET | Refills: 0 | Status: SHIPPED | OUTPATIENT
Start: 2024-11-01 | End: 2024-11-11

## 2024-11-01 RX ORDER — FLUCONAZOLE 150 MG/1
TABLET ORAL
Qty: 2 TABLET | Refills: 0 | Status: SHIPPED | OUTPATIENT
Start: 2024-11-01

## 2024-11-03 LAB
BACTERIA UR CULT: ABNORMAL
YEAST SPEC QL CULT: NORMAL

## 2024-11-04 ENCOUNTER — APPOINTMENT (OUTPATIENT)
Dept: PODIATRY | Facility: CLINIC | Age: 64
End: 2024-11-04
Payer: COMMERCIAL

## 2024-11-05 LAB
YEAST SPEC QL CULT: ABNORMAL
YEAST SPEC QL CULT: ABNORMAL

## 2024-12-02 ENCOUNTER — OFFICE VISIT (OUTPATIENT)
Dept: URGENT CARE | Age: 64
End: 2024-12-02
Payer: COMMERCIAL

## 2024-12-02 VITALS
WEIGHT: 239 LBS | OXYGEN SATURATION: 95 % | HEART RATE: 85 BPM | BODY MASS INDEX: 43.71 KG/M2 | SYSTOLIC BLOOD PRESSURE: 148 MMHG | DIASTOLIC BLOOD PRESSURE: 82 MMHG | RESPIRATION RATE: 16 BRPM | TEMPERATURE: 98.1 F

## 2024-12-02 DIAGNOSIS — R35.0 FREQUENT URINATION: ICD-10-CM

## 2024-12-02 DIAGNOSIS — N39.0 URINARY TRACT INFECTION WITHOUT HEMATURIA, SITE UNSPECIFIED: Primary | ICD-10-CM

## 2024-12-02 LAB
POC APPEARANCE, URINE: CLEAR
POC BILIRUBIN, URINE: NEGATIVE
POC BLOOD, URINE: ABNORMAL
POC COLOR, URINE: YELLOW
POC GLUCOSE, URINE: NEGATIVE MG/DL
POC KETONES, URINE: NEGATIVE MG/DL
POC LEUKOCYTES, URINE: NEGATIVE
POC NITRITE,URINE: POSITIVE
POC PH, URINE: 6 PH
POC PROTEIN, URINE: NEGATIVE MG/DL
POC SPECIFIC GRAVITY, URINE: >=1.03
POC UROBILINOGEN, URINE: 0.2 EU/DL

## 2024-12-02 PROCEDURE — 87186 SC STD MICRODIL/AGAR DIL: CPT

## 2024-12-02 PROCEDURE — 87086 URINE CULTURE/COLONY COUNT: CPT

## 2024-12-02 RX ORDER — SULFAMETHOXAZOLE AND TRIMETHOPRIM 800; 160 MG/1; MG/1
1 TABLET ORAL 2 TIMES DAILY
Qty: 10 TABLET | Refills: 0 | Status: SHIPPED | OUTPATIENT
Start: 2024-12-02 | End: 2024-12-07

## 2024-12-02 RX ORDER — FLUCONAZOLE 150 MG/1
150 TABLET ORAL SEE ADMIN INSTRUCTIONS
Qty: 2 TABLET | Refills: 0 | Status: SHIPPED | OUTPATIENT
Start: 2024-12-02 | End: 2024-12-03

## 2024-12-02 ASSESSMENT — PAIN SCALES - GENERAL: PAINLEVEL_OUTOF10: 0-NO PAIN

## 2024-12-02 ASSESSMENT — ENCOUNTER SYMPTOMS: FREQUENCY: 1

## 2024-12-03 NOTE — PROGRESS NOTES
Subjective   Patient ID: Arcelia Segovia is a 64 y.o. female. They present today with a chief complaint of Urinary Frequency and Vaginal Discharge (X 1 week).    History of Present Illness  : Patient is a very pleasant 64-year-old -American female, past medical Struve hypertension, presented to clinic with complaint of frequent urination.  Patient reporting about 4 to 5-day history of frequent urination along with vaginal irritation.  She also reporting some mild burning as well.  No abdominal pain, nausea, vomiting.  No myalgias, arthralgias, generalized weakness, fatigue, numbness, tingling, focal weakness.  Denies any back pain or flank pain.  States he has been eating and drinking without difficulty.  No further complaints at this time.      Urinary Frequency  Vaginal Discharge      Past Medical History  Allergies as of 12/02/2024    (No Known Allergies)       (Not in a hospital admission)         Past Medical History:   Diagnosis Date    Hypertension     Urinary frequency        Past Surgical History:   Procedure Laterality Date    APPENDECTOMY      CHOLECYSTECTOMY  06/21/2016    ESOPHAGOGASTRODUODENOSCOPY      TUBAL LIGATION          reports that she has quit smoking. Her smoking use included cigarettes. She has never used smokeless tobacco. She reports that she does not currently use alcohol. She reports that she does not use drugs.    Review of Systems  Review of Systems   Genitourinary:  Positive for frequency and vaginal discharge.                                  Objective    Vitals:    12/02/24 1845   BP: 148/82   Pulse: 85   Resp: 16   Temp: 36.7 °C (98.1 °F)   TempSrc: Oral   SpO2: 95%   Weight: 108 kg (239 lb)     No LMP recorded (approximate). Patient is postmenopausal.    Physical Exam  General: Vitals Noted. No distress. Normocephalic.     HEENT: TMs normal, EOMI, normal conjunctiva, patent nares, Normal OP    Neck: Supple with no adenopathy.     Cardiac: Regular Rate and Rhythm. No  murmur.     Pulmonary: Equal breath sounds bilaterally. No wheezes, rhonchi, or rales.    Abdomen: Soft, non-tender, with normal bowel sounds.  No CVA tenderness bilaterally.    Musculoskeletal: Moves all extremities, no effusion, no edema.     Skin: No obvious rashes.    Procedures    Point of Care Test & Imaging Results from this visit  No results found.    Diagnostic study results (if any) were reviewed by Jose Angel Galvin PA-C.    Assessment/Plan   Allergies, medications, history, and pertinent labs/EKGs/Imaging reviewed by Jose Angel Galvin PA-C.     Medical Decision Making  Patient was seen eval in the clinic for complaint of urinary symptoms.  On exam patient is nontoxic very well-appearing resting comfortably no acute distress.  Vital signs are stable, afebrile.  Chest is clear, heart is regular, belly is diffusely soft and nontender.  No CVA tenderness bilaterally.  Urinalysis was performed in the clinic which does show concern for acute urinary tract infection.  Will place on Bactrim.  Culture will be sent.  Advised to drink plenty of clear fluids.  Minimal concern for acute pyelonephritis at this time given lack of CVA tenderness.  Minimal concern for acute intra-abdominal etiologies given very benign and reassuring abdominal exam.  She be discharged home at this time.  I reviewed my impression, plan, strict return precautions with the patient.  She expresses understanding and agreement plan of care.    Orders and Diagnoses  Diagnoses and all orders for this visit:  Frequent urination  -     POCT UA Automated manually resulted        Medical Admin Record      Follow Up Instructions  No follow-ups on file.    Patient disposition: Home    Electronically signed by Jose Angel Galvin PA-C  7:02 PM

## 2024-12-05 ENCOUNTER — TELEPHONE (OUTPATIENT)
Dept: URGENT CARE | Age: 64
End: 2024-12-05

## 2024-12-05 LAB — BACTERIA UR CULT: ABNORMAL

## 2024-12-05 RX ORDER — NITROFURANTOIN 25; 75 MG/1; MG/1
100 CAPSULE ORAL 2 TIMES DAILY
Qty: 14 CAPSULE | Refills: 0 | Status: SHIPPED | OUTPATIENT
Start: 2024-12-05 | End: 2024-12-12

## 2024-12-30 ENCOUNTER — OFFICE VISIT (OUTPATIENT)
Dept: URGENT CARE | Age: 64
End: 2024-12-30
Payer: COMMERCIAL

## 2024-12-30 VITALS
SYSTOLIC BLOOD PRESSURE: 142 MMHG | OXYGEN SATURATION: 95 % | RESPIRATION RATE: 18 BRPM | DIASTOLIC BLOOD PRESSURE: 84 MMHG | TEMPERATURE: 97.7 F | HEART RATE: 75 BPM

## 2024-12-30 DIAGNOSIS — R35.0 URINARY FREQUENCY: ICD-10-CM

## 2024-12-30 DIAGNOSIS — N39.0 URINARY TRACT INFECTION WITHOUT HEMATURIA, SITE UNSPECIFIED: Primary | ICD-10-CM

## 2024-12-30 LAB
POC BILIRUBIN, URINE: NEGATIVE
POC BLOOD, URINE: ABNORMAL
POC GLUCOSE, URINE: NEGATIVE MG/DL
POC KETONES, URINE: NEGATIVE MG/DL
POC LEUKOCYTES, URINE: ABNORMAL
POC NITRITE,URINE: NEGATIVE
POC PH, URINE: 6 PH
POC PROTEIN, URINE: NEGATIVE MG/DL
POC SPECIFIC GRAVITY, URINE: 1.02
POC UROBILINOGEN, URINE: 0.2 EU/DL

## 2024-12-30 PROCEDURE — 87186 SC STD MICRODIL/AGAR DIL: CPT

## 2024-12-30 PROCEDURE — 81003 URINALYSIS AUTO W/O SCOPE: CPT | Performed by: PHYSICIAN ASSISTANT

## 2024-12-30 PROCEDURE — 99213 OFFICE O/P EST LOW 20 MIN: CPT | Performed by: PHYSICIAN ASSISTANT

## 2024-12-30 PROCEDURE — 1036F TOBACCO NON-USER: CPT | Performed by: PHYSICIAN ASSISTANT

## 2024-12-30 PROCEDURE — 3077F SYST BP >= 140 MM HG: CPT | Performed by: PHYSICIAN ASSISTANT

## 2024-12-30 PROCEDURE — 87086 URINE CULTURE/COLONY COUNT: CPT

## 2024-12-30 PROCEDURE — 3079F DIAST BP 80-89 MM HG: CPT | Performed by: PHYSICIAN ASSISTANT

## 2024-12-30 RX ORDER — NITROFURANTOIN 25; 75 MG/1; MG/1
100 CAPSULE ORAL 2 TIMES DAILY
Qty: 14 CAPSULE | Refills: 0 | Status: SHIPPED | OUTPATIENT
Start: 2024-12-30 | End: 2025-01-06

## 2024-12-30 ASSESSMENT — ENCOUNTER SYMPTOMS
FLANK PAIN: 0
FEVER: 0
FREQUENCY: 1
ABDOMINAL PAIN: 0
APPETITE CHANGE: 0
ACTIVITY CHANGE: 0
NAUSEA: 0
DYSURIA: 0
DIARRHEA: 0
HEMATURIA: 0

## 2024-12-30 NOTE — PATIENT INSTRUCTIONS
increase fluids, take antibiotics until gone.  Follow up with your primary provider in 3-5 days if no improvement.  Culture was sent and we will call you if another antibiotic is needed.

## 2024-12-30 NOTE — PROGRESS NOTES
Subjective   Patient ID: Arcelia Segovia is a 64 y.o. female. They present today with a chief complaint of UTI (Frequent urination, 4 days).    History of Present Illness  63 YO F C/O FREQUENCY X 4 DAYS WITH SOME VAGINAL IRRITATION.       UTI  Associated symptoms: no abdominal pain, no diarrhea, no fever, no nausea and no rash        Past Medical History  Allergies as of 12/30/2024    (No Known Allergies)       (Not in a hospital admission)       Past Medical History:   Diagnosis Date    Hypertension     Urinary frequency        Past Surgical History:   Procedure Laterality Date    APPENDECTOMY      CHOLECYSTECTOMY  06/21/2016    ESOPHAGOGASTRODUODENOSCOPY      TUBAL LIGATION          reports that she has quit smoking. Her smoking use included cigarettes. She has never used smokeless tobacco. She reports that she does not currently use alcohol. She reports that she does not use drugs.    Review of Systems  Review of Systems   Constitutional:  Negative for activity change, appetite change and fever.   Gastrointestinal:  Negative for abdominal pain, diarrhea and nausea.   Genitourinary:  Positive for frequency, urgency and vaginal discharge. Negative for decreased urine volume, dysuria, flank pain, hematuria, pelvic pain and vaginal pain.   Skin:  Negative for rash.                                  Objective    Vitals:    12/30/24 0927   BP: 142/84   Pulse: 75   Resp: 18   Temp: 36.5 °C (97.7 °F)   SpO2: 95%     No LMP recorded (lmp unknown). Patient is postmenopausal.    Physical Exam  Vitals and nursing note reviewed.   Constitutional:       Appearance: Normal appearance.   Cardiovascular:      Rate and Rhythm: Normal rate and regular rhythm.      Heart sounds: Normal heart sounds.   Pulmonary:      Effort: Pulmonary effort is normal.      Breath sounds: Normal breath sounds.   Abdominal:      General: Abdomen is flat. Bowel sounds are normal.      Palpations: Abdomen is soft.      Tenderness: There is no right  CVA tenderness or left CVA tenderness.   Skin:     General: Skin is warm and dry.   Neurological:      Mental Status: She is alert and oriented to person, place, and time.         Procedures    Point of Care Test & Imaging Results from this visit  Results for orders placed or performed in visit on 12/30/24   POCT UA Automated manually resulted   Result Value Ref Range    POC Glucose, Urine NEGATIVE NEGATIVE mg/dl    POC Bilirubin, Urine NEGATIVE NEGATIVE    POC Ketones, Urine NEGATIVE NEGATIVE mg/dl    POC Specific Gravity, Urine 1.025 1.005 - 1.035    POC Blood, Urine SMALL (1+) (A) NEGATIVE    POC PH, Urine 6.0 No Reference Range Established PH    POC Protein, Urine NEGATIVE NEGATIVE mg/dl    POC Urobilinogen, Urine 0.2 0.2, 1.0 EU/DL    Poc Nitrite, Urine NEGATIVE NEGATIVE    POC Leukocytes, Urine SMALL (1+) (A) NEGATIVE      No results found.    Diagnostic study results (if any) were reviewed by Clarita Ro PA-C.    Assessment/Plan   Allergies, medications, history, and pertinent labs/EKGs/Imaging reviewed by Clarita Ro PA-C.     Medical Decision Making      Orders and Diagnoses  Diagnoses and all orders for this visit:  Urinary tract infection without hematuria, site unspecified  -     nitrofurantoin, macrocrystal-monohydrate, (Macrobid) 100 mg capsule; Take 1 capsule (100 mg) by mouth 2 times a day for 7 days.  Urinary frequency  -     POCT UA Automated manually resulted      Medical Admin Record      Patient disposition: Home    Electronically signed by Clarita Ro PA-C  9:40 AM

## 2025-01-01 NOTE — PROGRESS NOTES
Status: Preliminary result       Visible to patient: No (not released)       Dx: Urinary frequency    Specimen Information: Clean Catch/Voided; Urine   0 Result Notes  Urine Culture 20,000 - 80,000 CFU/mL Enteric bacilli Abnormal         Resulting Agency: [facility]          Pt. Has been treated with Macrobid.

## 2025-01-02 LAB — BACTERIA UR CULT: ABNORMAL

## 2025-01-17 ENCOUNTER — OFFICE VISIT (OUTPATIENT)
Dept: PRIMARY CARE | Facility: CLINIC | Age: 65
End: 2025-01-17
Payer: COMMERCIAL

## 2025-01-17 ENCOUNTER — LAB (OUTPATIENT)
Dept: LAB | Facility: LAB | Age: 65
End: 2025-01-17
Payer: COMMERCIAL

## 2025-01-17 VITALS
HEIGHT: 62 IN | HEART RATE: 77 BPM | OXYGEN SATURATION: 99 % | BODY MASS INDEX: 42.29 KG/M2 | DIASTOLIC BLOOD PRESSURE: 90 MMHG | TEMPERATURE: 97.2 F | SYSTOLIC BLOOD PRESSURE: 150 MMHG | WEIGHT: 229.8 LBS

## 2025-01-17 DIAGNOSIS — Z23 ENCOUNTER FOR IMMUNIZATION: ICD-10-CM

## 2025-01-17 DIAGNOSIS — R73.03 PREDIABETES: ICD-10-CM

## 2025-01-17 DIAGNOSIS — E66.813 CLASS 3 SEVERE OBESITY DUE TO EXCESS CALORIES WITH SERIOUS COMORBIDITY AND BODY MASS INDEX (BMI) OF 40.0 TO 44.9 IN ADULT: ICD-10-CM

## 2025-01-17 DIAGNOSIS — K58.0 IRRITABLE BOWEL SYNDROME WITH DIARRHEA: ICD-10-CM

## 2025-01-17 DIAGNOSIS — E78.2 MIXED HYPERLIPIDEMIA: ICD-10-CM

## 2025-01-17 DIAGNOSIS — E66.01 CLASS 3 SEVERE OBESITY DUE TO EXCESS CALORIES WITH SERIOUS COMORBIDITY AND BODY MASS INDEX (BMI) OF 40.0 TO 44.9 IN ADULT: ICD-10-CM

## 2025-01-17 DIAGNOSIS — R30.0 DYSURIA: ICD-10-CM

## 2025-01-17 DIAGNOSIS — I10 BENIGN ESSENTIAL HYPERTENSION: Primary | ICD-10-CM

## 2025-01-17 DIAGNOSIS — I10 BENIGN ESSENTIAL HYPERTENSION: ICD-10-CM

## 2025-01-17 LAB
ALBUMIN SERPL BCP-MCNC: 4.1 G/DL (ref 3.4–5)
ALP SERPL-CCNC: 81 U/L (ref 33–136)
ALT SERPL W P-5'-P-CCNC: 18 U/L (ref 7–45)
AMYLASE SERPL-CCNC: 34 U/L (ref 29–103)
ANION GAP SERPL CALC-SCNC: 14 MMOL/L (ref 10–20)
AST SERPL W P-5'-P-CCNC: 14 U/L (ref 9–39)
BASOPHILS # BLD AUTO: 0.06 X10*3/UL (ref 0–0.1)
BASOPHILS NFR BLD AUTO: 1 %
BILIRUB SERPL-MCNC: 0.5 MG/DL (ref 0–1.2)
BUN SERPL-MCNC: 13 MG/DL (ref 6–23)
CALCIUM SERPL-MCNC: 9.7 MG/DL (ref 8.6–10.6)
CHLORIDE SERPL-SCNC: 103 MMOL/L (ref 98–107)
CO2 SERPL-SCNC: 30 MMOL/L (ref 21–32)
CREAT SERPL-MCNC: 0.75 MG/DL (ref 0.5–1.05)
EGFRCR SERPLBLD CKD-EPI 2021: 90 ML/MIN/1.73M*2
EOSINOPHIL # BLD AUTO: 0.05 X10*3/UL (ref 0–0.7)
EOSINOPHIL NFR BLD AUTO: 0.8 %
ERYTHROCYTE [DISTWIDTH] IN BLOOD BY AUTOMATED COUNT: 14.7 % (ref 11.5–14.5)
EST. AVERAGE GLUCOSE BLD GHB EST-MCNC: 123 MG/DL
GLUCOSE SERPL-MCNC: 86 MG/DL (ref 74–99)
HBA1C MFR BLD: 5.9 %
HCT VFR BLD AUTO: 42.6 % (ref 36–46)
HGB BLD-MCNC: 13.3 G/DL (ref 12–16)
IMM GRANULOCYTES # BLD AUTO: 0.02 X10*3/UL (ref 0–0.7)
IMM GRANULOCYTES NFR BLD AUTO: 0.3 % (ref 0–0.9)
LIPASE SERPL-CCNC: 8 U/L (ref 9–82)
LYMPHOCYTES # BLD AUTO: 2.14 X10*3/UL (ref 1.2–4.8)
LYMPHOCYTES NFR BLD AUTO: 35.8 %
MCH RBC QN AUTO: 29.7 PG (ref 26–34)
MCHC RBC AUTO-ENTMCNC: 31.2 G/DL (ref 32–36)
MCV RBC AUTO: 95 FL (ref 80–100)
MONOCYTES # BLD AUTO: 0.46 X10*3/UL (ref 0.1–1)
MONOCYTES NFR BLD AUTO: 7.7 %
NEUTROPHILS # BLD AUTO: 3.25 X10*3/UL (ref 1.2–7.7)
NEUTROPHILS NFR BLD AUTO: 54.4 %
NRBC BLD-RTO: 0 /100 WBCS (ref 0–0)
PLATELET # BLD AUTO: 297 X10*3/UL (ref 150–450)
POC APPEARANCE, URINE: ABNORMAL
POC BILIRUBIN, URINE: NEGATIVE
POC BLOOD, URINE: NEGATIVE
POC COLOR, URINE: YELLOW
POC GLUCOSE, URINE: NEGATIVE MG/DL
POC KETONES, URINE: NEGATIVE MG/DL
POC LEUKOCYTES, URINE: NEGATIVE
POC NITRITE,URINE: NEGATIVE
POC PH, URINE: 5.5 PH
POC PROTEIN, URINE: NEGATIVE MG/DL
POC SPECIFIC GRAVITY, URINE: 1.02
POC UROBILINOGEN, URINE: 0.2 EU/DL
POTASSIUM SERPL-SCNC: 3.9 MMOL/L (ref 3.5–5.3)
PROT SERPL-MCNC: 7.1 G/DL (ref 6.4–8.2)
RBC # BLD AUTO: 4.48 X10*6/UL (ref 4–5.2)
SODIUM SERPL-SCNC: 143 MMOL/L (ref 136–145)
TSH SERPL-ACNC: 1.44 MIU/L (ref 0.44–3.98)
WBC # BLD AUTO: 6 X10*3/UL (ref 4.4–11.3)

## 2025-01-17 PROCEDURE — 83036 HEMOGLOBIN GLYCOSYLATED A1C: CPT

## 2025-01-17 PROCEDURE — 90471 IMMUNIZATION ADMIN: CPT | Performed by: NURSE PRACTITIONER

## 2025-01-17 PROCEDURE — 3077F SYST BP >= 140 MM HG: CPT | Performed by: NURSE PRACTITIONER

## 2025-01-17 PROCEDURE — 90677 PCV20 VACCINE IM: CPT | Performed by: NURSE PRACTITIONER

## 2025-01-17 PROCEDURE — 82150 ASSAY OF AMYLASE: CPT

## 2025-01-17 PROCEDURE — 80053 COMPREHEN METABOLIC PANEL: CPT

## 2025-01-17 PROCEDURE — 81003 URINALYSIS AUTO W/O SCOPE: CPT | Performed by: NURSE PRACTITIONER

## 2025-01-17 PROCEDURE — 85025 COMPLETE CBC W/AUTO DIFF WBC: CPT

## 2025-01-17 PROCEDURE — 99214 OFFICE O/P EST MOD 30 MIN: CPT | Performed by: NURSE PRACTITIONER

## 2025-01-17 PROCEDURE — 83690 ASSAY OF LIPASE: CPT

## 2025-01-17 PROCEDURE — 84443 ASSAY THYROID STIM HORMONE: CPT

## 2025-01-17 PROCEDURE — 3080F DIAST BP >= 90 MM HG: CPT | Performed by: NURSE PRACTITIONER

## 2025-01-17 PROCEDURE — 3008F BODY MASS INDEX DOCD: CPT | Performed by: NURSE PRACTITIONER

## 2025-01-17 PROCEDURE — 87086 URINE CULTURE/COLONY COUNT: CPT

## 2025-01-17 RX ORDER — CEPHALEXIN 500 MG/1
1 CAPSULE ORAL
COMMUNITY
Start: 2024-07-26 | End: 2025-01-17 | Stop reason: ALTCHOICE

## 2025-01-17 RX ORDER — SEMAGLUTIDE 0.25 MG/.5ML
0.25 INJECTION, SOLUTION SUBCUTANEOUS WEEKLY
Qty: 2 ML | Refills: 0 | Status: SHIPPED | OUTPATIENT
Start: 2025-01-17 | End: 2025-02-08

## 2025-01-17 NOTE — PATIENT INSTRUCTIONS
BP well controlled  Continue current medications as prescribed.  Follow a low salt diet  Check blood pressure at home  Goal BP less than 135/85  Recommend regular aerobic exercise at least 30 minutes a day at least 5 days a week for cardiovascular health  Maintain a healthy weight    Blood work ordered for blood sugar monitoring    Recheck urine testing today  UA normal in office   Will wait for results of urine culture    Obesity: Refer to clinical pharmacy for wegovy    Loose stools: Trial of Xifaxan for suspected IBS-D  Recommend resuming probiotics daily for bowel health  Discussed importance of follow ing a low fat diet (due to lack of gallbladder)  Encouraged to reschedule colonoscopy    Follow up in 3-4 months for evaluation of therapy  Schedule annual wellness exam

## 2025-01-17 NOTE — PROGRESS NOTES
"Subjective   Patient ID: Arcelia Segovia is a 64 y.o. female who presents for Follow-up    Presents for follow up for BP check  Denies any HA, CP, SOB, vision changes  Tolerating medication well - increased urination.    Has been seen in urgent care for recurrence UTIs  Urgent care records reviewed.  Initially treated with Bactrim with no improvement in symptoms  Retreated 12/30 with nitrofurantoin. Feels better.  Still having urinary urgency.    Would like to explore medication options for weight loss  Insurance does not cover much  Adipex contraindicated due to HTN  Limited exercise due to ongoing loose stools.  Has been having difficulty with diet due to stools, everything seems to trigger bowels.  In the past went in a low fat diet and gained weight.  Is active at work, walks hospital all day.    Has been unable to get colonoscopy yet due to prep issues, scheduling with work.  Does desire to have done due to bowel concerns.  S/p cholecystectomy 2016  Since stools have been loose.  Admits better stool consistency when taking probiotics, stopped when ran out.  Reports in a high stress job - worries a lot.   Higher stress increases stool frequency.   Currently 3 times a day regardless of stress, what eaten.   Does interfere with work responsibilities when more frequent.      Review of Systems    Objective     /90 (BP Location: Right arm, Patient Position: Sitting, BP Cuff Size: Large adult)   Pulse 77   Temp 36.2 °C (97.2 °F) (Temporal)   Ht 1.575 m (5' 2\")   Wt 104 kg (229 lb 12.8 oz)   LMP  (LMP Unknown)   SpO2 99%   BMI 42.03 kg/m²      Physical Exam  Vitals and nursing note reviewed.   Constitutional:       General: She is not in acute distress.     Appearance: Normal appearance. She is obese.   Cardiovascular:      Rate and Rhythm: Normal rate.      Pulses: Normal pulses.      Heart sounds: Normal heart sounds. No murmur heard.  Pulmonary:      Effort: Pulmonary effort is normal. No respiratory " distress.      Breath sounds: Normal breath sounds.   Abdominal:      General: Bowel sounds are increased.      Palpations: Abdomen is soft.      Tenderness: There is no abdominal tenderness. There is no right CVA tenderness or left CVA tenderness.   Lymphadenopathy:      Cervical: No cervical adenopathy.   Psychiatric:         Mood and Affect: Affect normal. Mood is anxious (mild).         Behavior: Behavior normal.         Judgment: Judgment normal.          Assessment/Plan   Diagnoses and all orders for this visit:  Benign essential hypertension  -     Comprehensive metabolic panel; Future  -     Hemoglobin A1c; Future  Mixed hyperlipidemia  Prediabetes  -     Comprehensive metabolic panel; Future  -     Hemoglobin A1c; Future  Dysuria  -     POCT UA Automated manually resulted  -     Urine Culture  Encounter for immunization  -     Pneumococcal conjugate vaccine, 20-valent (PREVNAR 20)  Irritable bowel syndrome with diarrhea  -     rifAXIMin (Xifaxan) 550 mg tablet; Take 1 tablet (550 mg) by mouth 3 times a day for 14 days.  -     CBC and Auto Differential; Future  -     Amylase; Future  -     Lipase; Future  -     Tsh With Reflex To Free T4 If Abnormal; Future  Class 3 severe obesity due to excess calories with serious comorbidity and body mass index (BMI) of 40.0 to 44.9 in adult  -     semaglutide, weight loss, (Wegovy) 0.25 mg/0.5 mL pen injector; Inject 0.25 mg under the skin 1 (one) time per week for 4 doses.  -     Referral to Clinical Pharmacy; Future      Patient Instructions   BP well controlled  Continue current medications as prescribed.  Follow a low salt diet  Check blood pressure at home  Goal BP less than 135/85  Recommend regular aerobic exercise at least 30 minutes a day at least 5 days a week for cardiovascular health  Maintain a healthy weight    Blood work ordered for blood sugar monitoring    Recheck urine testing today  UA normal in office   Will wait for results of urine  culture    Obesity: Refer to clinical pharmacy for wegovy    Loose stools: Trial of Xifaxan for suspected IBS-D  Recommend resuming probiotics daily for bowel health  Discussed importance of follow ing a low fat diet (due to lack of gallbladder)  Encouraged to reschedule colonoscopy    Follow up in 3-4 months for evaluation of therapy  Schedule annual wellness exam

## 2025-01-19 LAB — BACTERIA UR CULT: NORMAL

## 2025-01-21 ENCOUNTER — PHARMACY VISIT (OUTPATIENT)
Dept: PHARMACY | Facility: CLINIC | Age: 65
End: 2025-01-21
Payer: COMMERCIAL

## 2025-01-21 DIAGNOSIS — E66.01 OBESITY, CLASS III, BMI 40-49.9 (MORBID OBESITY) (MULTI): Primary | ICD-10-CM

## 2025-01-21 DIAGNOSIS — I10 BENIGN ESSENTIAL HYPERTENSION: ICD-10-CM

## 2025-01-21 PROCEDURE — RXMED WILLOW AMBULATORY MEDICATION CHARGE

## 2025-01-21 RX ORDER — HYDROCHLOROTHIAZIDE 12.5 MG/1
TABLET ORAL
Qty: 90 TABLET | Refills: 1 | Status: SHIPPED | OUTPATIENT
Start: 2025-01-21

## 2025-01-23 ENCOUNTER — TELEMEDICINE (OUTPATIENT)
Dept: PHARMACY | Facility: HOSPITAL | Age: 65
End: 2025-01-23
Payer: COMMERCIAL

## 2025-01-23 DIAGNOSIS — E66.01 OBESITY, CLASS III, BMI 40-49.9 (MORBID OBESITY) (MULTI): ICD-10-CM

## 2025-01-23 PROBLEM — E66.813 OBESITY, CLASS III, BMI 40-49.9 (MORBID OBESITY): Status: ACTIVE | Noted: 2025-01-23

## 2025-01-23 NOTE — ASSESSMENT & PLAN NOTE
Patient is interested in trying to get Wegovy to help with weight loss. Patient does not have any insurance coverage through  employee insurance. Informed patient about savings card options to help with cost that would bring the cost to $650 per month. Patient will look over the information to decide if this is something that she wants to pursue.    Wegovy must be refrigerated. If necessary, the pen may be kept at room temperature for up to 28 days. Each box comes with 4 pens, one for each week.     1.  Remove the pen from the refrigerator. Keep the pen cap on until you are ready to inject.   2.  Check the pen label to make sure that it is the correct medication and dose. Also check to make sure that the solution is not cloudy, discolored or have particles in it.   3. Prior to administration wash your hands.   4. Choose your injection site either your abdomen or thigh (if someone else is giving the injection the upper arm can also be used).   5. Ensure to change (rotate) injection sites each week. You can use the same area of the body but inject in a different part of that area.   6. Once the injection site has been selected use an alcohol swab to clean off the area.   7. Remove the grey cap and press firmly onto the injection site.   8. Push the pen against the skin until the yellow bar has stopped moving. You will hear two clicks; one indicates that the injection has started, and the other indicates an ongoing injection. The injection process will take about 10 seconds.  9. Do not rub the area after administration   10. Remove pen and discard in a sharps container (such as a milk jug, detergent bottle, or coffee canister)   11. Injections should be done on the same day each week, if you forget you have up to 5 days to take your dose.    -  Counseled patient on Wegovy MOA, expectations, side effects, duration of therapy, administration, and monitoring parameters.  - Advised patient that they may experience  improved satiety after meals and portion sizes of meals may be reduced as doses of Wegovy increase.  - Counseled patient to avoid foods that are fatty/oily as this may precipitate the nausea/GI upset that may occur with new start Wegovy.    Future Considerations:  If patient does decide to pursue, will continue to follow up to titrate dose as needed    Monitoring and Education:  Counseled patient on relevant medication mechanisms of action, expectations, side effects, duration of therapy, contraindications, administration, and monitoring parameters.  All questions and concerns addressed. Contact pharmacist with any further questions or concerns prior to next appointment.

## 2025-01-23 NOTE — PROGRESS NOTES
Clinical Pharmacy Appointment    Patient ID: Arcelia Segovia is a 64 y.o. female who presents for Weight Loss.    Pt is here for First appointment.     Referring Provider: Sarah Munoz APRN-CNP  PCP: EBONY Dutton   Last visit with PCP: 1/17/2025   Next visit with PCP: 5/19/2025      Subjective   HPI  WEIGHT LOSS  BMI Readings from Last 3 Encounters:   01/17/25 42.03 kg/m²   12/02/24 43.71 kg/m²   10/08/24 43.86 kg/m²      Starting weight: 229 lbs. (As of appointment on 1/17/2025)  Current weight: 229 lbs.    Lifestyle  Diet: 2 meals/day. Low fat diet.   LN: Salad, sandwich  DN: Chicken, french fries, green beans, meatloaf  Snacks: cookie, chips  Drinks: Water  Has worked with nutritionist/dietician? Yes, tried to work with one for a short time at [facility]  Physical Activity: Walks at work, last year patient would go to [email or web address] twice a week    Other Potential Contributing Factors  Alcohol use: Average 0 drinks/week  Tobacco use: No  Other drug use: No  Medications that may cause weight gain: estrogens (Estrace 0.01% vaginal cream) rare, but still a theoretical chance for systemic absorption  Mental health: No current concerns  Eating Disorders: Denies Hx of any eating disorder  Comorbidities: Comorbidities: high cholesterol and hypertension controlled with oral meds      Non-Pharmacological Therapy  Weight loss techniques attempted:  Self-directed dieting: diet and exercise    Pharmacological Therapy  Current Medications: None  Previous Medications: None     Insurance coverage of weight-loss medications? No, [employer] does not cover for weight loss medications  Eligible for copay cards/programs? Yes, Patient has commercial insurance.  Eligible for  PAP? No, no insurance coverage to cover the cost of medication    Medication Estimated Cost Expected weight loss Patient Risks/Cautions Notes   Wegovy (semaglutide) ~$650/month w/ savings card 10-20% None      Zepbound (tirzepatide)  $650/month   w/ savings card 10-20%     Qsymia (phentermine-topiramate) $98/month via [facility]symia [employer] 8-10% Uncontrolled HTN Controlled substance   Contrave (bupropion-naltrexone) $99/month   via [employer] 5-10% Uncontrolled HTN    Adipex (phentermine) ~$15/month 3-5% Uncontrolled HTN Controlled substance,  Short-term use only   Khadar (OTC Orlistat) ~$60/month 3-5%  Adverse effects likely outweigh benefit.         Drug Interactions  No relevant drug interactions were noted.    Medication System Management  Patient's preferred pharmacy: [facility]      Objective   No Known Allergies  Social History     Social History Narrative    Not on file      Medication Review  Current Outpatient Medications   Medication Instructions    atorvastatin (Lipitor) 20 mg tablet Take 1 tablet  by mouth once daily at bedtime    estradiol (Estrace) 0.01 % (0.1 mg/gram) vaginal cream Apply a pea size amount before bedtime 3 times per week.    hydroCHLOROthiazide (Microzide) 12.5 mg tablet Take 1 tablet by mouth every day    potassium chloride CR (Klor-Con M20) 20 mEq ER tablet 20 mEq, oral, Daily    rifAXIMin (XIFAXAN) 550 mg, oral, 3 times daily    Wegovy 0.25 mg, subcutaneous, Weekly      Vitals  BP Readings from Last 2 Encounters:   01/17/25 150/90   12/30/24 142/84     BMI Readings from Last 1 Encounters:   01/17/25 42.03 kg/m²      Labs  A1C  Lab Results   Component Value Date    HGBA1C 5.9 (H) 01/17/2025    HGBA1C 5.9 (H) 05/11/2024    HGBA1C 5.8 (A) 02/10/2023     BMP  Lab Results   Component Value Date    CALCIUM 9.7 01/17/2025     01/17/2025    K 3.9 01/17/2025    CO2 30 01/17/2025     01/17/2025    BUN 13 01/17/2025    CREATININE 0.75 01/17/2025    EGFR 90 01/17/2025     LFTs  Lab Results   Component Value Date    ALT 18 01/17/2025    AST 14 01/17/2025    ALKPHOS 81 01/17/2025    BILITOT 0.5 01/17/2025     FLP  Lab Results   Component Value Date    TRIG 93 05/11/2024    CHOL 144 05/11/2024    LDLF 165 (H)  "02/10/2023    LDLCALC 84 05/11/2024    HDL 41.9 05/11/2024     Urine Microalbumin  No results found for: \"MICROALBCREA\"  Weight Management  Wt Readings from Last 3 Encounters:   01/17/25 104 kg (229 lb 12.8 oz)   12/02/24 108 kg (239 lb)   10/08/24 109 kg (239 lb 12.8 oz)      There is no height or weight on file to calculate BMI.     Assessment/Plan   Problem List Items Addressed This Visit       Obesity, Class III, BMI 40-49.9 (morbid obesity) (Multi)     Patient is interested in trying to get Wegovy to help with weight loss. Patient does not have any insurance coverage through  employee insurance. Informed patient about savings card options to help with cost that would bring the cost to $650 per month. Patient will look over the information to decide if this is something that she wants to pursue.    Wegovy must be refrigerated. If necessary, the pen may be kept at room temperature for up to 28 days. Each box comes with 4 pens, one for each week.     1.  Remove the pen from the refrigerator. Keep the pen cap on until you are ready to inject.   2.  Check the pen label to make sure that it is the correct medication and dose. Also check to make sure that the solution is not cloudy, discolored or have particles in it.   3. Prior to administration wash your hands.   4. Choose your injection site either your abdomen or thigh (if someone else is giving the injection the upper arm can also be used).   5. Ensure to change (rotate) injection sites each week. You can use the same area of the body but inject in a different part of that area.   6. Once the injection site has been selected use an alcohol swab to clean off the area.   7. Remove the grey cap and press firmly onto the injection site.   8. Push the pen against the skin until the yellow bar has stopped moving. You will hear two clicks; one indicates that the injection has started, and the other indicates an ongoing injection. The injection process will take about " 10 seconds.  9. Do not rub the area after administration   10. Remove pen and discard in a sharps container (such as a milk jug, detergent bottle, or coffee canister)   11. Injections should be done on the same day each week, if you forget you have up to 5 days to take your dose.    -  Counseled patient on Wegovy MOA, expectations, side effects, duration of therapy, administration, and monitoring parameters.  - Advised patient that they may experience improved satiety after meals and portion sizes of meals may be reduced as doses of Wegovy increase.  - Counseled patient to avoid foods that are fatty/oily as this may precipitate the nausea/GI upset that may occur with new start Wegovy.    Future Considerations:  If patient does decide to pursue, will continue to follow up to titrate dose as needed    Monitoring and Education:  Counseled patient on relevant medication mechanisms of action, expectations, side effects, duration of therapy, contraindications, administration, and monitoring parameters.  All questions and concerns addressed. Contact pharmacist with any further questions or concerns prior to next appointment.             Clinical Pharmacist follow-up: if patient decides to move forward with medication, Telehealth visit    Continue all meds under the continuation of care with the referring provider and clinical pharmacy team.    Thank you,  Cristy Willams, PharmD  Clinical Pharmacist - Primary Care  [phone]  1/23/2025    Verbal consent to manage patient's drug therapy was obtained from the patient. They were informed they may decline to participate or withdraw from participation in pharmacy services at any time.

## 2025-02-04 DIAGNOSIS — Z12.11 ENCOUNTER FOR SCREENING FOR MALIGNANT NEOPLASM OF COLON: Primary | ICD-10-CM

## 2025-02-11 DIAGNOSIS — Z12.11 COLON CANCER SCREENING: Primary | ICD-10-CM

## 2025-02-11 PROCEDURE — RXMED WILLOW AMBULATORY MEDICATION CHARGE

## 2025-02-11 RX ORDER — POLYETHYLENE GLYCOL 3350, SODIUM CHLORIDE, SODIUM BICARBONATE, POTASSIUM CHLORIDE 420; 11.2; 5.72; 1.48 G/4L; G/4L; G/4L; G/4L
4000 POWDER, FOR SOLUTION ORAL ONCE
Qty: 4000 ML | Refills: 0 | Status: SHIPPED | OUTPATIENT
Start: 2025-02-11 | End: 2025-02-17

## 2025-02-16 ENCOUNTER — PHARMACY VISIT (OUTPATIENT)
Dept: PHARMACY | Facility: CLINIC | Age: 65
End: 2025-02-16
Payer: COMMERCIAL

## 2025-02-16 PROCEDURE — RXOTC WILLOW AMBULATORY OTC CHARGE

## 2025-03-05 ENCOUNTER — APPOINTMENT (OUTPATIENT)
Dept: OBSTETRICS AND GYNECOLOGY | Facility: HOSPITAL | Age: 65
End: 2025-03-05
Payer: COMMERCIAL

## 2025-03-13 ENCOUNTER — APPOINTMENT (OUTPATIENT)
Dept: OBSTETRICS AND GYNECOLOGY | Facility: CLINIC | Age: 65
End: 2025-03-13
Payer: COMMERCIAL

## 2025-03-19 ENCOUNTER — OFFICE VISIT (OUTPATIENT)
Dept: OBSTETRICS AND GYNECOLOGY | Facility: HOSPITAL | Age: 65
End: 2025-03-19
Payer: COMMERCIAL

## 2025-03-19 VITALS
WEIGHT: 233 LBS | SYSTOLIC BLOOD PRESSURE: 155 MMHG | DIASTOLIC BLOOD PRESSURE: 79 MMHG | HEIGHT: 63 IN | BODY MASS INDEX: 41.29 KG/M2

## 2025-03-19 DIAGNOSIS — Z12.31 ENCOUNTER FOR SCREENING MAMMOGRAM FOR BREAST CANCER: ICD-10-CM

## 2025-03-19 DIAGNOSIS — N89.8 VAGINAL ODOR: ICD-10-CM

## 2025-03-19 DIAGNOSIS — Z01.419 WELL WOMAN EXAM WITH ROUTINE GYNECOLOGICAL EXAM: Primary | ICD-10-CM

## 2025-03-19 DIAGNOSIS — Z12.31 ENCOUNTER FOR SCREENING MAMMOGRAM FOR MALIGNANT NEOPLASM OF BREAST: ICD-10-CM

## 2025-03-19 PROCEDURE — 99386 PREV VISIT NEW AGE 40-64: CPT | Performed by: NURSE PRACTITIONER

## 2025-03-19 PROCEDURE — 99396 PREV VISIT EST AGE 40-64: CPT | Performed by: NURSE PRACTITIONER

## 2025-03-19 PROCEDURE — 3078F DIAST BP <80 MM HG: CPT | Performed by: NURSE PRACTITIONER

## 2025-03-19 PROCEDURE — 87624 HPV HI-RISK TYP POOLED RSLT: CPT | Performed by: NURSE PRACTITIONER

## 2025-03-19 PROCEDURE — 3008F BODY MASS INDEX DOCD: CPT | Performed by: NURSE PRACTITIONER

## 2025-03-19 PROCEDURE — 1036F TOBACCO NON-USER: CPT | Performed by: NURSE PRACTITIONER

## 2025-03-19 PROCEDURE — 3077F SYST BP >= 140 MM HG: CPT | Performed by: NURSE PRACTITIONER

## 2025-03-19 SDOH — ECONOMIC STABILITY: FOOD INSECURITY: WITHIN THE PAST 12 MONTHS, YOU WORRIED THAT YOUR FOOD WOULD RUN OUT BEFORE YOU GOT MONEY TO BUY MORE.: NEVER TRUE

## 2025-03-19 SDOH — ECONOMIC STABILITY: FOOD INSECURITY: WITHIN THE PAST 12 MONTHS, THE FOOD YOU BOUGHT JUST DIDN'T LAST AND YOU DIDN'T HAVE MONEY TO GET MORE.: NEVER TRUE

## 2025-03-19 ASSESSMENT — ENCOUNTER SYMPTOMS
OCCASIONAL FEELINGS OF UNSTEADINESS: 0
DEPRESSION: 0
LOSS OF SENSATION IN FEET: 0

## 2025-03-19 ASSESSMENT — PATIENT HEALTH QUESTIONNAIRE - PHQ9
2. FEELING DOWN, DEPRESSED OR HOPELESS: NOT AT ALL
SUM OF ALL RESPONSES TO PHQ9 QUESTIONS 1 & 2: 0
1. LITTLE INTEREST OR PLEASURE IN DOING THINGS: NOT AT ALL

## 2025-03-19 ASSESSMENT — SOCIAL DETERMINANTS OF HEALTH (SDOH)
WITHIN THE LAST YEAR, HAVE YOU BEEN KICKED, HIT, SLAPPED, OR OTHERWISE PHYSICALLY HURT BY YOUR PARTNER OR EX-PARTNER?: NO
WITHIN THE LAST YEAR, HAVE YOU BEEN AFRAID OF YOUR PARTNER OR EX-PARTNER?: NO
WITHIN THE LAST YEAR, HAVE YOU BEEN HUMILIATED OR EMOTIONALLY ABUSED IN OTHER WAYS BY YOUR PARTNER OR EX-PARTNER?: NO
WITHIN THE LAST YEAR, HAVE TO BEEN RAPED OR FORCED TO HAVE ANY KIND OF SEXUAL ACTIVITY BY YOUR PARTNER OR EX-PARTNER?: NO

## 2025-03-19 ASSESSMENT — PAIN SCALES - GENERAL: PAINLEVEL_OUTOF10: 0-NO PAIN

## 2025-03-19 NOTE — PROGRESS NOTES
"Fang Kirkpatrick, APRN-CNP     Subjective   Arcelia Segovia is a 64 y.o. female who presents for annual exam.   64-year-old female G3, P3 here for annual exam.    History reviewed.  Patient has concerns regarding GI problems reporting diarrhea for months.  She does have an upcoming GI appointment.    Feels as though she has a vaginal scent and is asking about over-the-counter products to help with odor.  We will test today for bacterial vaginosis.  Hygiene practices reviewed.  Symptoms:  No Menopausal Symptoms    Past Medical History:   Diagnosis Date    Hypertension     Urinary frequency      Past Surgical History:   Procedure Laterality Date    APPENDECTOMY      CHOLECYSTECTOMY  2016    ESOPHAGOGASTRODUODENOSCOPY      TUBAL LIGATION         OB History          3    Para   3    Term   3            AB        Living   3         SAB        IAB        Ectopic        Multiple        Live Births   3               No LMP recorded (lmp unknown). Patient is postmenopausal.    Review of Systems   All other systems reviewed and are negative.    Breast: No Complaints   Vaginal: Vaginal odor No Complaints          Objective   /79   Ht 1.6 m (5' 3\")   Wt 106 kg (233 lb)   LMP  (LMP Unknown)   BMI 41.27 kg/m²   Physical Exam  Constitutional:       Appearance: She is obese.   Genitourinary:      Urethral meatus normal.      Right Labia: No rash.     Left Labia: No rash.     No vaginal discharge.      No vaginal prolapse present.     Mild vaginal atrophy present.       Right Adnexa: no mass present.     Left Adnexa: no mass present.     No cervical motion tenderness.      Uterus is not enlarged.   Breasts:     Right: Normal.      Left: Normal.   HENT:      Head: Normocephalic.   Cardiovascular:      Rate and Rhythm: Normal rate.   Pulmonary:      Effort: Pulmonary effort is normal.   Abdominal:      Palpations: Abdomen is soft.   Musculoskeletal:      Cervical back: Normal range of motion. "   Neurological:      Mental Status: She is alert.   Skin:     General: Skin is warm and dry.   Psychiatric:         Mood and Affect: Mood normal.                Assessment/Plan   Problem List Items Addressed This Visit    None  Visit Diagnoses         Codes    Well woman exam with routine gynecological exam    -  Primary Z01.419    Relevant Orders    THINPREP PAP TEST (>30)    Encounter for screening mammogram for breast cancer     Z12.31    Encounter for screening mammogram for malignant neoplasm of breast     Z12.31    Relevant Orders    BI mammo bilateral screening tomosynthesis    Vaginal odor     N89.8    Relevant Orders    Vaginitis Gram Stain For Bacterial Vaginosis + Yeast

## 2025-03-20 ENCOUNTER — TELEPHONE (OUTPATIENT)
Dept: OBSTETRICS AND GYNECOLOGY | Facility: CLINIC | Age: 65
End: 2025-03-20
Payer: COMMERCIAL

## 2025-03-20 LAB — BV SCORE VAG QL: NORMAL

## 2025-03-21 NOTE — TELEPHONE ENCOUNTER
Attempted to contact pt to discuss results-no answer, VM box full.    Pt was c/o vaginal odor at OV.   Vaginitis panel negative.   Pap/HPV results still in process at this time.

## 2025-03-28 ENCOUNTER — HOSPITAL ENCOUNTER (OUTPATIENT)
Dept: RADIOLOGY | Facility: CLINIC | Age: 65
Discharge: HOME | End: 2025-03-28
Payer: COMMERCIAL

## 2025-03-28 VITALS — WEIGHT: 233.69 LBS | HEIGHT: 63 IN | BODY MASS INDEX: 41.41 KG/M2

## 2025-03-28 DIAGNOSIS — Z12.31 ENCOUNTER FOR SCREENING MAMMOGRAM FOR MALIGNANT NEOPLASM OF BREAST: ICD-10-CM

## 2025-03-28 PROCEDURE — 77063 BREAST TOMOSYNTHESIS BI: CPT | Performed by: RADIOLOGY

## 2025-03-28 PROCEDURE — 77067 SCR MAMMO BI INCL CAD: CPT

## 2025-03-28 PROCEDURE — 77067 SCR MAMMO BI INCL CAD: CPT | Performed by: RADIOLOGY

## 2025-04-29 ENCOUNTER — APPOINTMENT (OUTPATIENT)
Dept: OPHTHALMOLOGY | Facility: CLINIC | Age: 65
End: 2025-04-29
Payer: COMMERCIAL

## 2025-05-02 ENCOUNTER — PHARMACY VISIT (OUTPATIENT)
Dept: PHARMACY | Facility: CLINIC | Age: 65
End: 2025-05-02
Payer: COMMERCIAL

## 2025-05-02 PROCEDURE — RXOTC WILLOW AMBULATORY OTC CHARGE

## 2025-05-02 PROCEDURE — RXMED WILLOW AMBULATORY MEDICATION CHARGE

## 2025-05-12 ENCOUNTER — APPOINTMENT (OUTPATIENT)
Dept: PODIATRY | Facility: CLINIC | Age: 65
End: 2025-05-12
Payer: COMMERCIAL

## 2025-05-19 ENCOUNTER — APPOINTMENT (OUTPATIENT)
Dept: PRIMARY CARE | Facility: CLINIC | Age: 65
End: 2025-05-19
Payer: COMMERCIAL

## 2025-05-20 ENCOUNTER — APPOINTMENT (OUTPATIENT)
Dept: GASTROENTEROLOGY | Facility: HOSPITAL | Age: 65
End: 2025-05-20
Payer: COMMERCIAL

## 2025-06-19 ENCOUNTER — APPOINTMENT (OUTPATIENT)
Dept: PRIMARY CARE | Facility: CLINIC | Age: 65
End: 2025-06-19
Payer: COMMERCIAL

## 2025-06-19 VITALS
HEIGHT: 63 IN | WEIGHT: 232.6 LBS | SYSTOLIC BLOOD PRESSURE: 130 MMHG | TEMPERATURE: 97.4 F | DIASTOLIC BLOOD PRESSURE: 78 MMHG | OXYGEN SATURATION: 96 % | BODY MASS INDEX: 41.21 KG/M2 | HEART RATE: 78 BPM

## 2025-06-19 DIAGNOSIS — E78.2 MIXED HYPERLIPIDEMIA: ICD-10-CM

## 2025-06-19 DIAGNOSIS — E66.813 CLASS 3 SEVERE OBESITY DUE TO EXCESS CALORIES WITH SERIOUS COMORBIDITY AND BODY MASS INDEX (BMI) OF 40.0 TO 44.9 IN ADULT: ICD-10-CM

## 2025-06-19 DIAGNOSIS — K58.0 IRRITABLE BOWEL SYNDROME WITH DIARRHEA: ICD-10-CM

## 2025-06-19 DIAGNOSIS — K64.9 HEMORRHOIDS, UNSPECIFIED HEMORRHOID TYPE: ICD-10-CM

## 2025-06-19 DIAGNOSIS — I10 BENIGN ESSENTIAL HYPERTENSION: Primary | ICD-10-CM

## 2025-06-19 DIAGNOSIS — R73.03 PREDIABETES: ICD-10-CM

## 2025-06-19 PROBLEM — R30.0 DYSURIA: Status: RESOLVED | Noted: 2023-08-21 | Resolved: 2025-06-19

## 2025-06-19 PROBLEM — N39.0 URINARY TRACT INFECTION: Status: RESOLVED | Noted: 2018-11-28 | Resolved: 2025-06-19

## 2025-06-19 PROCEDURE — 3078F DIAST BP <80 MM HG: CPT | Performed by: NURSE PRACTITIONER

## 2025-06-19 PROCEDURE — 3008F BODY MASS INDEX DOCD: CPT | Performed by: NURSE PRACTITIONER

## 2025-06-19 PROCEDURE — 99214 OFFICE O/P EST MOD 30 MIN: CPT | Performed by: NURSE PRACTITIONER

## 2025-06-19 PROCEDURE — 1036F TOBACCO NON-USER: CPT | Performed by: NURSE PRACTITIONER

## 2025-06-19 PROCEDURE — 3075F SYST BP GE 130 - 139MM HG: CPT | Performed by: NURSE PRACTITIONER

## 2025-06-19 PROCEDURE — RXMED WILLOW AMBULATORY MEDICATION CHARGE

## 2025-06-19 RX ORDER — ATORVASTATIN CALCIUM 20 MG/1
TABLET, FILM COATED ORAL
Qty: 90 TABLET | Refills: 1 | Status: SHIPPED | OUTPATIENT
Start: 2025-06-19

## 2025-06-19 RX ORDER — HYDROCHLOROTHIAZIDE 12.5 MG/1
TABLET ORAL
Qty: 90 TABLET | Refills: 1 | Status: SHIPPED | OUTPATIENT
Start: 2025-06-19

## 2025-06-19 RX ORDER — POTASSIUM CHLORIDE 20 MEQ/1
20 TABLET, EXTENDED RELEASE ORAL DAILY
Qty: 90 TABLET | Refills: 1 | Status: SHIPPED | OUTPATIENT
Start: 2025-06-19

## 2025-06-19 RX ORDER — HYDROCORTISONE ACETATE 25 MG/1
25 SUPPOSITORY RECTAL 2 TIMES DAILY
Qty: 20 SUPPOSITORY | Refills: 1 | Status: SHIPPED | OUTPATIENT
Start: 2025-06-19 | End: 2025-07-01

## 2025-06-19 ASSESSMENT — PAIN SCALES - GENERAL: PAINLEVEL_OUTOF10: 0-NO PAIN

## 2025-06-19 NOTE — PATIENT INSTRUCTIONS
BP controlled  Continue current medications as prescribed.  Follow a low salt diet  Check blood pressure at home  Goal BP less than 135/85  Recommend regular aerobic exercise at least 30 minutes a day at least 5 days a week for cardiovascular health  Maintain a healthy weight    Reassured of right ankle ganglion cyst.    Xifaxin for IBS-D symptoms  Keep scheduled colonoscopy appointment for October.    Trial of hydrocortisone suppositories after bowel movements with blood to help shrink suspected internal hemorrhoids.   Follow up with any changes in these symptoms    Follow up in 3-4 months for evaluation of therapy

## 2025-06-19 NOTE — LETTER
June 19, 2025     Patient: Arcelia Segovia   YOB: 1960   Date of Visit: 6/19/2025       To Whom It May Concern:    Arcelia Segovia was seen in my clinic on 6/19/2025 at 8:20 am. Please excuse Arcelia for her absence from work on this day to make the appointment.    If you have any questions or concerns, please don't hesitate to call.         Sincerely,         Sarah Munoz, GENOVEVA-CNP        CC: No Recipients

## 2025-06-19 NOTE — PROGRESS NOTES
Subjective   Patient ID: Arcelia Segovia is a 64 y.o. female who presents for Hypertension and Headache    History of Present Illness  The patient presents for a follow-up visit.    Following up for blood pressure check. She reports no issues with her blood pressure medication but notes that she is nearing the end of her current supply. She does not monitor her blood pressure at home but was informed that it is within normal range. She is not experiencing any chest pain or shortness of breath.  She has recently undergone a mammogram and is scheduled for a colonoscopy in 10/2025.   She was unable to get Wegovy. She continues her atorvastatin regimen and requires a refill. She has not had any blood work done this year.  She has not been able to obtain Xifaxan from pharmacy. She maintains hydration by drinking water, particularly at home.    She experiences occasional abdominal cramping after consuming fatty foods but otherwise reports no discomfort. s/p cholecystectomy.  She has been incorporating more exercise into her routine, including walking from the parking lot to her workplace and during her lunch break. She has made one visit to the gym but acknowledges the need for consistency. She is in the process of modifying her diet. She tries to avoid eating fatty foods every day and consumes an apple each morning and a variety of vegetables when at home.    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 The swelling tends to reduce overnight or when she elevates her feet. She has not sought medical attention for this issue. She uses compression socks, which she finds beneficial.    She occasionally observes bright red blood on the toilet paper after bowel movements, particularly if she has had multiple bowel movements in a day. She reports no rectal pain or difficulty with bowel movements. Her most recent episode of bleeding was last week.       Review of  "Systems    Objective     /78 (BP Location: Right arm, Patient Position: Sitting, BP Cuff Size: Large adult)   Pulse 78   Temp 36.3 °C (97.4 °F) (Temporal)   Ht 1.6 m (5' 3\")   Wt 106 kg (232 lb 9.6 oz)   LMP  (LMP Unknown)   SpO2 96%   BMI 41.20 kg/m²      Physical Exam  Vitals and nursing note reviewed.   Constitutional:       General: She is not in acute distress.     Appearance: Normal appearance. She is obese.   Cardiovascular:      Rate and Rhythm: Normal rate and regular rhythm.      Heart sounds: Normal heart sounds. No murmur heard.  Pulmonary:      Effort: Pulmonary effort is normal. No respiratory distress.      Breath sounds: Normal breath sounds.   Musculoskeletal:      Right lower leg: No edema.      Left lower leg: No edema.      Right ankle: Swelling and deformity (lateral cyst (?ganglion)) present.      Right Achilles Tendon: Normal.      Left ankle: Swelling present.      Left Achilles Tendon: Normal.      Comments: R>L, non pitting1+   Lymphadenopathy:      Cervical: No cervical adenopathy.   Skin:     Capillary Refill: Capillary refill takes less than 2 seconds.   Psychiatric:         Mood and Affect: Mood normal.       Assessment/Plan   Diagnoses and all orders for this visit:  Benign essential hypertension  -     hydroCHLOROthiazide (Microzide) 12.5 mg tablet; Take 1 tablet by mouth every day  -     potassium chloride CR (Klor-Con M20) 20 mEq ER tablet; Take 1 tablet (20 mEq) by mouth once daily.  -     Basic metabolic panel  Mixed hyperlipidemia  -     atorvastatin (Lipitor) 20 mg tablet; Take 1 tablet  by mouth once daily at bedtime  -     Lipid panel  Prediabetes  -     Hemoglobin A1c  -     Basic metabolic panel  Class 3 severe obesity due to excess calories with serious comorbidity and body mass index (BMI) of 40.0 to 44.9 in adult  Irritable bowel syndrome with diarrhea  -     rifAXIMin (Xifaxan) 550 mg tablet; Take 1 tablet (550 mg) by mouth 3 times a day for 14 " days.  Hemorrhoids, unspecified hemorrhoid type  -     hydrocortisone (Anusol-HC) 25 mg suppository; Insert 1 suppository (25 mg) into the rectum 2 times a day for 10 days.    Assessment & Plan  1. Hypertension: Stable.  - Prescription refill for HCTZ and Potassium.  - Advised to monitor blood pressure at home.    2. Right ankle Ganglion cyst.  - Advised to continue using compression stockings and elevate the leg as needed.  - If symptoms such as numbness or tingling occur, a referral to an orthopedist will be considered.    3. Suspected Internal hemorrhoids.  - Prescription for hydrocortisone suppositories, to be used as needed following frequent bowel movements.  - Discussed symptoms and management options.    4. Health Maintenance.  - Cholesterol panel and A1c test will be conducted today.    5. Abdominal pain.  - Advised to avoid fatty foods and continue eating fruits and vegetables.  - Prescription for Xifaxan, to be taken three times daily for 14 days.  - Discussed dietary changes and management of symptoms.    Follow-up  - Scheduled colonoscopy in October 2025.       Patient Instructions   BP controlled  Continue current medications as prescribed.  Follow a low salt diet  Check blood pressure at home  Goal BP less than 135/85  Recommend regular aerobic exercise at least 30 minutes a day at least 5 days a week for cardiovascular health  Maintain a healthy weight    Reassured of right ankle ganglion cyst.    Xifaxin for IBS-D symptoms  Keep scheduled colonoscopy appointment for October.    Trial of hydrocortisone suppositories after bowel movements with blood to help shrink suspected internal hemorrhoids.   Follow up with any changes in these symptoms    Follow up in 3-4 months for evaluation of therapy     This medical note was created with the assistance of artificial intelligence (AI) for documentation purposes. The content has been reviewed and confirmed by the healthcare provider for accuracy and  completeness. Patient consented to the use of audio recording and use of AI during their visit.

## 2025-06-20 DIAGNOSIS — R73.03 PREDIABETES: ICD-10-CM

## 2025-06-20 DIAGNOSIS — E78.2 MIXED HYPERLIPIDEMIA: ICD-10-CM

## 2025-06-20 DIAGNOSIS — E66.813 CLASS 3 SEVERE OBESITY DUE TO EXCESS CALORIES WITH SERIOUS COMORBIDITY AND BODY MASS INDEX (BMI) OF 40.0 TO 44.9 IN ADULT: ICD-10-CM

## 2025-06-20 DIAGNOSIS — I10 BENIGN ESSENTIAL HYPERTENSION: Primary | ICD-10-CM

## 2025-06-20 LAB
ANION GAP SERPL CALCULATED.4IONS-SCNC: 11 MMOL/L (CALC) (ref 7–17)
BUN SERPL-MCNC: 13 MG/DL (ref 7–25)
BUN/CREAT SERPL: NORMAL (CALC) (ref 6–22)
CALCIUM SERPL-MCNC: 9.2 MG/DL (ref 8.6–10.4)
CHLORIDE SERPL-SCNC: 105 MMOL/L (ref 98–110)
CHOLEST SERPL-MCNC: 246 MG/DL
CHOLEST/HDLC SERPL: 5.7 (CALC)
CO2 SERPL-SCNC: 26 MMOL/L (ref 20–32)
CREAT SERPL-MCNC: 0.58 MG/DL (ref 0.5–1.05)
EGFRCR SERPLBLD CKD-EPI 2021: 102 ML/MIN/1.73M2
EST. AVERAGE GLUCOSE BLD GHB EST-MCNC: 131 MG/DL
EST. AVERAGE GLUCOSE BLD GHB EST-SCNC: 7.3 MMOL/L
GLUCOSE SERPL-MCNC: 97 MG/DL (ref 65–99)
HBA1C MFR BLD: 6.2 %
HDLC SERPL-MCNC: 43 MG/DL
LDLC SERPL CALC-MCNC: 177 MG/DL (CALC)
NONHDLC SERPL-MCNC: 203 MG/DL (CALC)
POTASSIUM SERPL-SCNC: 3.5 MMOL/L (ref 3.5–5.3)
SODIUM SERPL-SCNC: 142 MMOL/L (ref 135–146)
TRIGL SERPL-MCNC: 128 MG/DL

## 2025-06-21 ENCOUNTER — PHARMACY VISIT (OUTPATIENT)
Dept: PHARMACY | Facility: CLINIC | Age: 65
End: 2025-06-21
Payer: COMMERCIAL

## 2025-07-08 ENCOUNTER — TELEPHONE (OUTPATIENT)
Dept: PRIMARY CARE | Facility: CLINIC | Age: 65
End: 2025-07-08
Payer: COMMERCIAL

## 2025-07-08 NOTE — TELEPHONE ENCOUNTER
Patient wanting something for Dry and Flaky/peeling, previous prescribed something for it, Does now know what it was and no record in chart. Please advise

## 2025-07-10 DIAGNOSIS — R21 RASH: Primary | ICD-10-CM

## 2025-07-10 RX ORDER — MUPIROCIN 20 MG/G
OINTMENT TOPICAL 3 TIMES DAILY
Qty: 22 G | Refills: 0 | Status: SHIPPED | OUTPATIENT
Start: 2025-07-10 | End: 2025-07-20

## 2025-07-23 ENCOUNTER — CLINICAL SUPPORT (OUTPATIENT)
Dept: PRIMARY CARE | Facility: CLINIC | Age: 65
End: 2025-07-23
Payer: COMMERCIAL

## 2025-07-23 ENCOUNTER — PHARMACY VISIT (OUTPATIENT)
Dept: PHARMACY | Facility: CLINIC | Age: 65
End: 2025-07-23
Payer: COMMERCIAL

## 2025-07-23 DIAGNOSIS — R39.9 UTI SYMPTOMS: Primary | ICD-10-CM

## 2025-07-23 LAB
POC APPEARANCE, URINE: ABNORMAL
POC BILIRUBIN, URINE: NEGATIVE
POC BLOOD, URINE: ABNORMAL
POC COLOR, URINE: YELLOW
POC GLUCOSE, URINE: NEGATIVE MG/DL
POC KETONES, URINE: NEGATIVE MG/DL
POC LEUKOCYTES, URINE: NEGATIVE
POC NITRITE,URINE: NEGATIVE
POC PH, URINE: 5.5 PH
POC PROTEIN, URINE: NEGATIVE MG/DL
POC SPECIFIC GRAVITY, URINE: 1.02
POC UROBILINOGEN, URINE: 0.2 EU/DL

## 2025-07-23 PROCEDURE — 81003 URINALYSIS AUTO W/O SCOPE: CPT | Performed by: NURSE PRACTITIONER

## 2025-07-23 PROCEDURE — RXMED WILLOW AMBULATORY MEDICATION CHARGE

## 2025-07-23 NOTE — PROGRESS NOTES
Pt here today for nurse visit-UA and urine culture. Urine sample received and automatically tested. Sample also being sent out for culture.    Results:  GLU neg  МАРИЯ neg  KET neg  SG 1.025  BLO small  pH 5.5  PRO neg  URO 0.2  NIT neg  MIGUEL neg    Routing to Sarah Munoz NP

## 2025-07-26 LAB — BACTERIA UR CULT: ABNORMAL

## 2025-08-05 PROCEDURE — RXMED WILLOW AMBULATORY MEDICATION CHARGE

## 2025-08-09 ENCOUNTER — PHARMACY VISIT (OUTPATIENT)
Dept: PHARMACY | Facility: CLINIC | Age: 65
End: 2025-08-09
Payer: COMMERCIAL

## 2025-11-13 ENCOUNTER — APPOINTMENT (OUTPATIENT)
Dept: PRIMARY CARE | Facility: CLINIC | Age: 65
End: 2025-11-13
Payer: COMMERCIAL